# Patient Record
Sex: MALE | Race: BLACK OR AFRICAN AMERICAN | Employment: PART TIME | URBAN - METROPOLITAN AREA
[De-identification: names, ages, dates, MRNs, and addresses within clinical notes are randomized per-mention and may not be internally consistent; named-entity substitution may affect disease eponyms.]

---

## 2017-02-20 RX ORDER — AMLODIPINE BESYLATE 10 MG/1
TABLET ORAL
Qty: 90 TABLET | Refills: 2 | Status: SHIPPED | OUTPATIENT
Start: 2017-02-20 | End: 2017-11-18

## 2017-05-01 ENCOUNTER — OFFICE VISIT (OUTPATIENT)
Dept: INTERNAL MEDICINE CLINIC | Facility: CLINIC | Age: 57
End: 2017-05-01

## 2017-05-01 ENCOUNTER — APPOINTMENT (OUTPATIENT)
Dept: LAB | Facility: HOSPITAL | Age: 57
End: 2017-05-01
Attending: INTERNAL MEDICINE
Payer: COMMERCIAL

## 2017-05-01 VITALS
DIASTOLIC BLOOD PRESSURE: 74 MMHG | HEART RATE: 66 BPM | TEMPERATURE: 98 F | WEIGHT: 175.63 LBS | RESPIRATION RATE: 18 BRPM | SYSTOLIC BLOOD PRESSURE: 138 MMHG | HEIGHT: 69 IN | BODY MASS INDEX: 26.01 KG/M2

## 2017-05-01 DIAGNOSIS — Z11.59 NEED FOR HEPATITIS C SCREENING TEST: ICD-10-CM

## 2017-05-01 DIAGNOSIS — Z85.528 HISTORY OF RENAL CELL CANCER: ICD-10-CM

## 2017-05-01 DIAGNOSIS — Z23 NEED FOR VACCINATION: ICD-10-CM

## 2017-05-01 DIAGNOSIS — K90.41 GLUTEN INTOLERANCE: ICD-10-CM

## 2017-05-01 DIAGNOSIS — I10 ESSENTIAL HYPERTENSION WITH GOAL BLOOD PRESSURE LESS THAN 140/90: Primary | ICD-10-CM

## 2017-05-01 DIAGNOSIS — N40.1 BENIGN PROSTATIC HYPERPLASIA WITH LOWER URINARY TRACT SYMPTOMS, UNSPECIFIED MORPHOLOGY: ICD-10-CM

## 2017-05-01 PROCEDURE — 90471 IMMUNIZATION ADMIN: CPT | Performed by: INTERNAL MEDICINE

## 2017-05-01 PROCEDURE — 99214 OFFICE O/P EST MOD 30 MIN: CPT | Performed by: INTERNAL MEDICINE

## 2017-05-01 PROCEDURE — 99212 OFFICE O/P EST SF 10 MIN: CPT | Performed by: INTERNAL MEDICINE

## 2017-05-01 PROCEDURE — 90736 HZV VACCINE LIVE SUBQ: CPT | Performed by: INTERNAL MEDICINE

## 2017-05-01 PROCEDURE — 86803 HEPATITIS C AB TEST: CPT

## 2017-05-01 PROCEDURE — 36415 COLL VENOUS BLD VENIPUNCTURE: CPT

## 2017-05-01 RX ORDER — DIAZEPAM 5 MG/1
5 TABLET ORAL
COMMUNITY
Start: 2010-06-02 | End: 2017-05-01 | Stop reason: ALTCHOICE

## 2017-05-01 RX ORDER — AMOXICILLIN 250 MG
1 CAPSULE ORAL
COMMUNITY
Start: 2010-06-02 | End: 2017-05-22

## 2017-05-01 RX ORDER — HYDROCODONE BITARTRATE AND ACETAMINOPHEN 5; 325 MG/1; MG/1
1-2 TABLET ORAL
COMMUNITY
Start: 2010-06-02 | End: 2017-05-01 | Stop reason: ALTCHOICE

## 2017-05-01 RX ORDER — TAMSULOSIN HYDROCHLORIDE 0.4 MG/1
0.4 CAPSULE ORAL DAILY
Qty: 30 CAPSULE | Refills: 5 | Status: SHIPPED | OUTPATIENT
Start: 2017-05-01 | End: 2017-06-26

## 2017-05-01 NOTE — PROGRESS NOTES
HPI:    Patient ID: Arleen Harvey is a 62year old male. HPI  Patient history of chronic medical issues as listed below. Last seen here for general physical exam.  He was doing reasonably well at that time. Blood pressure was a little bit elevated.   He Bilateral     Comment knee injuries    LAPAROSCOPY, SURGICAL; NEPHRECTOMY Right April 2015    Comment Robotic surgery at [facility]      Family History   Problem Relation Age of Onset   • Hypertension Mother    • Arthritis Mother      gout   • Cancer Nose: Nose normal.   Mouth/Throat: Oropharynx is clear and moist.   Eyes: Pupils are equal, round, and reactive to light. Cardiovascular: Normal rate, regular rhythm, normal heart sounds and intact distal pulses.   Exam reveals no gallop and no friction test.    (Z23) Need for vaccination  Plan: ZOSTER VACC LIVE SUBQ [address]        We will give shingles vaccination today.       Meds This Visit:  No prescriptions requested or ordered in this encounter       Imaging & Referrals:  None         #[id number]

## 2017-05-20 RX ORDER — METOPROLOL SUCCINATE 100 MG/1
TABLET, EXTENDED RELEASE ORAL
Qty: 90 TABLET | Refills: 3 | Status: SHIPPED | OUTPATIENT
Start: 2017-05-20 | End: 2018-04-23

## 2017-05-22 ENCOUNTER — OFFICE VISIT (OUTPATIENT)
Dept: SURGERY | Facility: CLINIC | Age: 57
End: 2017-05-22

## 2017-05-22 VITALS
SYSTOLIC BLOOD PRESSURE: 161 MMHG | HEART RATE: 64 BPM | HEIGHT: 69 IN | BODY MASS INDEX: 25.92 KG/M2 | DIASTOLIC BLOOD PRESSURE: 80 MMHG | WEIGHT: 175 LBS | TEMPERATURE: 98 F | RESPIRATION RATE: 16 BRPM

## 2017-05-22 DIAGNOSIS — R82.90 URINE FINDING: ICD-10-CM

## 2017-05-22 DIAGNOSIS — C64.1 RENAL CANCER, RIGHT (HCC): Primary | ICD-10-CM

## 2017-05-22 DIAGNOSIS — N13.30 HYDRONEPHROSIS, LEFT: ICD-10-CM

## 2017-05-22 PROCEDURE — 99213 OFFICE O/P EST LOW 20 MIN: CPT | Performed by: UROLOGY

## 2017-05-22 PROCEDURE — 81003 URINALYSIS AUTO W/O SCOPE: CPT | Performed by: UROLOGY

## 2017-05-22 PROCEDURE — 99214 OFFICE O/P EST MOD 30 MIN: CPT | Performed by: UROLOGY

## 2017-05-22 NOTE — PROGRESS NOTES
Willis Ramirez is a 62year old male. HPI:   Patient presents with:  Kidney Problem: Patient on Flomax. Patient denies urinary obstructive issues. Nocturia 2x per night. Patient states night time urination has improved. Denies dysuria and gross hematuria. Comment Robotic surgery at [facility]      Family History   Problem Relation Age of Onset   • Hypertension Mother    • Arthritis Mother      gout   • Cancer Father      salivary and kidney      Social History:   Smoking Status: Former Smoker weeks otherwise. I spent a total of 25 minutes with patient more than half the time in face-to-face discussion.          Orders This Visit:    Orders Placed This Encounter  POC Urinalysis, Automated Dip without microscopy (PCA and EMMG ONLY) [78412]    Med

## 2017-05-30 ENCOUNTER — APPOINTMENT (OUTPATIENT)
Dept: LAB | Facility: HOSPITAL | Age: 57
End: 2017-05-30
Attending: UROLOGY
Payer: COMMERCIAL

## 2017-05-30 DIAGNOSIS — C64.1 RENAL CANCER, RIGHT (HCC): ICD-10-CM

## 2017-05-30 PROCEDURE — 80053 COMPREHEN METABOLIC PANEL: CPT

## 2017-05-30 PROCEDURE — 36415 COLL VENOUS BLD VENIPUNCTURE: CPT

## 2017-05-30 PROCEDURE — 85027 COMPLETE CBC AUTOMATED: CPT

## 2017-06-01 ENCOUNTER — HOSPITAL ENCOUNTER (OUTPATIENT)
Dept: ULTRASOUND IMAGING | Facility: HOSPITAL | Age: 57
Discharge: HOME | End: 2017-06-01
Attending: UROLOGY
Payer: COMMERCIAL

## 2017-06-01 DIAGNOSIS — N13.30 HYDRONEPHROSIS, LEFT: ICD-10-CM

## 2017-06-01 PROCEDURE — 76770 US EXAM ABDO BACK WALL COMP: CPT | Performed by: UROLOGY

## 2017-06-12 ENCOUNTER — OFFICE VISIT (OUTPATIENT)
Dept: SURGERY | Facility: CLINIC | Age: 57
End: 2017-06-12

## 2017-06-12 VITALS
HEART RATE: 67 BPM | TEMPERATURE: 99 F | SYSTOLIC BLOOD PRESSURE: 122 MMHG | DIASTOLIC BLOOD PRESSURE: 76 MMHG | RESPIRATION RATE: 16 BRPM

## 2017-06-12 DIAGNOSIS — C64.1 RENAL CANCER, RIGHT (HCC): Primary | ICD-10-CM

## 2017-06-12 PROCEDURE — 99213 OFFICE O/P EST LOW 20 MIN: CPT | Performed by: UROLOGY

## 2017-06-12 PROCEDURE — 99212 OFFICE O/P EST SF 10 MIN: CPT | Performed by: UROLOGY

## 2017-06-12 NOTE — PROGRESS NOTES
Cathleen Munoz is a 62year old male. 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

## 2017-06-26 RX ORDER — TAMSULOSIN HYDROCHLORIDE 0.4 MG/1
0.4 CAPSULE ORAL DAILY
Qty: 30 CAPSULE | Refills: 2 | Status: SHIPPED | OUTPATIENT
Start: 2017-06-26 | End: 2017-09-17

## 2017-06-26 NOTE — TELEPHONE ENCOUNTER
Hypertensive Medications  Protocol Criteria:  · Appointment scheduled in the past 6 months or in the next 3 months  · BMP or CMP in the past 12 months  · Creatinine result < 2  Recent Outpatient Visits            2 weeks ago Renal cancer, right ([facility] [address]    El

## 2017-06-26 NOTE — TELEPHONE ENCOUNTER
From: Hector Burns  To: Sherrie Vincent MD  Sent: 6/23/2017 12:10 PM CDT  Subject: Medication Renewal Request    Original authorizing provider: MD Hector Blair would like a refill of the following medications:  tamsulosin HCl (FLOMAX) 0.4

## 2017-08-28 RX ORDER — LOSARTAN POTASSIUM 100 MG/1
100 TABLET ORAL
Qty: 90 TABLET | Refills: 1 | Status: SHIPPED | OUTPATIENT
Start: 2017-08-28 | End: 2017-11-18

## 2017-08-28 NOTE — TELEPHONE ENCOUNTER
From: Cat Eldridge  Sent: 8/26/2017 12:52 PM CDT  Subject: Medication Renewal Request    Cat Eldridge would like a refill of the following medications:  aspirin 81 MG Oral Tab Sera Vasquez MD]  Losartan Potassium 100 MG Oral Tab Sera Vasquez MD]    P

## 2017-08-29 RX ORDER — LOSARTAN POTASSIUM 100 MG/1
100 TABLET ORAL
Qty: 90 TABLET | Refills: 1 | OUTPATIENT
Start: 2017-08-29

## 2017-09-18 RX ORDER — TAMSULOSIN HYDROCHLORIDE 0.4 MG/1
0.4 CAPSULE ORAL DAILY
Qty: 30 CAPSULE | Refills: 2 | Status: CANCELLED
Start: 2017-09-18

## 2017-09-20 RX ORDER — TAMSULOSIN HYDROCHLORIDE 0.4 MG/1
0.4 CAPSULE ORAL DAILY
Qty: 30 CAPSULE | Refills: 5 | Status: SHIPPED | OUTPATIENT
Start: 2017-09-20 | End: 2018-03-15

## 2017-09-21 NOTE — TELEPHONE ENCOUNTER
From: Anh Fuller  Sent: 9/18/2017 9:19 AM CDT  Subject: Medication Renewal Request    Anh Fuller would like a refill of the following medications:     tamsulosin HCl (FLOMAX) 0.4 MG Oral Cap Patric Melton MD]   Patient Comment: 90 day refill    Prefer

## 2017-10-09 ENCOUNTER — OFFICE VISIT (OUTPATIENT)
Dept: INTERNAL MEDICINE CLINIC | Facility: CLINIC | Age: 57
End: 2017-10-09

## 2017-10-09 ENCOUNTER — LAB ENCOUNTER (OUTPATIENT)
Dept: LAB | Facility: HOSPITAL | Age: 57
End: 2017-10-09
Attending: INTERNAL MEDICINE
Payer: COMMERCIAL

## 2017-10-09 VITALS
HEIGHT: 69 IN | TEMPERATURE: 98 F | DIASTOLIC BLOOD PRESSURE: 78 MMHG | BODY MASS INDEX: 26.79 KG/M2 | WEIGHT: 180.88 LBS | SYSTOLIC BLOOD PRESSURE: 138 MMHG | RESPIRATION RATE: 20 BRPM | HEART RATE: 68 BPM

## 2017-10-09 DIAGNOSIS — K90.41 GLUTEN INTOLERANCE: ICD-10-CM

## 2017-10-09 DIAGNOSIS — Z23 NEED FOR VACCINATION: ICD-10-CM

## 2017-10-09 DIAGNOSIS — Z00.00 ROUTINE PHYSICAL EXAMINATION: ICD-10-CM

## 2017-10-09 DIAGNOSIS — Z00.00 ROUTINE PHYSICAL EXAMINATION: Primary | ICD-10-CM

## 2017-10-09 DIAGNOSIS — I10 ESSENTIAL HYPERTENSION WITH GOAL BLOOD PRESSURE LESS THAN 140/90: ICD-10-CM

## 2017-10-09 DIAGNOSIS — Z85.528 HISTORY OF RENAL CELL CANCER: ICD-10-CM

## 2017-10-09 PROCEDURE — 90471 IMMUNIZATION ADMIN: CPT | Performed by: INTERNAL MEDICINE

## 2017-10-09 PROCEDURE — 80061 LIPID PANEL: CPT

## 2017-10-09 PROCEDURE — 80053 COMPREHEN METABOLIC PANEL: CPT

## 2017-10-09 PROCEDURE — 90686 IIV4 VACC NO PRSV 0.5 ML IM: CPT | Performed by: INTERNAL MEDICINE

## 2017-10-09 PROCEDURE — 36415 COLL VENOUS BLD VENIPUNCTURE: CPT

## 2017-10-09 PROCEDURE — 99396 PREV VISIT EST AGE 40-64: CPT | Performed by: INTERNAL MEDICINE

## 2017-10-09 PROCEDURE — 85025 COMPLETE CBC W/AUTO DIFF WBC: CPT

## 2017-10-09 PROCEDURE — 84443 ASSAY THYROID STIM HORMONE: CPT

## 2017-10-09 NOTE — PROGRESS NOTES
HPI:    Patient ID: Octavia Price is a 62year old male. HPI  Patient is here for physical exam and follow-up on chronic medical issues as listed below. Last seen here in May. No changes made at that time. He had a CMP and CBC done at that time.   Chron 1993   Family History   Problem Relation Age of Onset   • Hypertension Mother    • Arthritis Mother      gout   • Cancer Father      salivary and kidney      Smoking status: Former Smoker                                                              Packs/d Physical Exam    Constitutional: He appears well-developed and well-nourished.    HENT:   Right Ear: Tympanic membrane and ear canal normal.   Left Ear: Tympanic membrane and ear canal normal.   Nose: Nose normal.   Mouth/Throat: Oropharynx is clear and m hypertension with goal blood pressure less than 140/90  Plan: Blood pressure little bit elevated initially but better when checked second time.   Continue same medications.    (Z85.528) History of renal cell cancer  Plan: No evidence of recurrence since vitor

## 2017-11-06 ENCOUNTER — OFFICE VISIT (OUTPATIENT)
Dept: SURGERY | Facility: CLINIC | Age: 57
End: 2017-11-06

## 2017-11-06 VITALS
HEIGHT: 69 IN | HEART RATE: 69 BPM | DIASTOLIC BLOOD PRESSURE: 95 MMHG | BODY MASS INDEX: 26.66 KG/M2 | WEIGHT: 180 LBS | SYSTOLIC BLOOD PRESSURE: 148 MMHG | TEMPERATURE: 99 F

## 2017-11-06 DIAGNOSIS — C64.9 MALIGNANT NEOPLASM OF KIDNEY, UNSPECIFIED LATERALITY (HCC): Primary | ICD-10-CM

## 2017-11-06 PROCEDURE — 99212 OFFICE O/P EST SF 10 MIN: CPT | Performed by: UROLOGY

## 2017-11-06 PROCEDURE — 99213 OFFICE O/P EST LOW 20 MIN: CPT | Performed by: UROLOGY

## 2017-11-06 NOTE — PROGRESS NOTES
Marcy Bassett is a 62year old male. HPI:   Patient presents with:  Kidney Problem: Denies any irritative or obstructive voiding issues.        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  Disp: 30 capsule Rfl: 5   aspirin 81 MG Oral

## 2017-11-18 RX ORDER — AMLODIPINE BESYLATE 10 MG/1
10 TABLET ORAL
Qty: 90 TABLET | Refills: 2 | Status: SHIPPED
Start: 2017-11-18 | End: 2018-08-10

## 2017-11-18 RX ORDER — LOSARTAN POTASSIUM 100 MG/1
100 TABLET ORAL
Qty: 90 TABLET | Refills: 2 | Status: SHIPPED
Start: 2017-11-18 | End: 2018-08-10

## 2017-11-18 NOTE — TELEPHONE ENCOUNTER
From: Bianka Brizuela  Sent: 11/18/2017 10:17 AM CST  Subject: Medication Renewal Request    Bianka Brizuela would like a refill of the following medications:     AMLODIPINE BESYLATE 10 MG Oral Tab Mason Vasquez MD]   Patient Comment: only 2 tabs left     losar

## 2017-11-18 NOTE — TELEPHONE ENCOUNTER
Signed Prescriptions Disp Refills    AmLODIPine Besylate 10 MG Oral Tab 90 tablet 2      Sig: Take 1 tablet (10 mg total) by mouth once daily.         Authorizing Provider: Je Archuleta        Ordering User: Marita Castro      losartan 100 MG Oral Ta

## 2017-11-20 ENCOUNTER — HOSPITAL ENCOUNTER (OUTPATIENT)
Dept: GENERAL RADIOLOGY | Facility: HOSPITAL | Age: 57
Discharge: HOME | End: 2017-11-20
Attending: UROLOGY
Payer: COMMERCIAL

## 2017-11-20 DIAGNOSIS — C64.9 MALIGNANT NEOPLASM OF KIDNEY, UNSPECIFIED LATERALITY (HCC): ICD-10-CM

## 2017-11-20 PROCEDURE — 71020 XR CHEST PA + LAT CHEST (CPT=71020): CPT | Performed by: UROLOGY

## 2017-12-19 ENCOUNTER — HOSPITAL ENCOUNTER (OUTPATIENT)
Dept: GENERAL RADIOLOGY | Facility: HOSPITAL | Age: 57
Discharge: HOME | End: 2017-12-19
Attending: ORTHOPAEDIC SURGERY | Admitting: ORTHOPAEDIC SURGERY
Payer: COMMERCIAL

## 2017-12-19 ENCOUNTER — OFFICE VISIT (OUTPATIENT)
Dept: ORTHOPEDICS CLINIC | Facility: CLINIC | Age: 57
End: 2017-12-19

## 2017-12-19 DIAGNOSIS — M50.30 DDD (DEGENERATIVE DISC DISEASE), CERVICAL: ICD-10-CM

## 2017-12-19 DIAGNOSIS — M25.561 RIGHT KNEE PAIN, UNSPECIFIED CHRONICITY: ICD-10-CM

## 2017-12-19 DIAGNOSIS — M54.2 NECK PAIN: ICD-10-CM

## 2017-12-19 DIAGNOSIS — M17.11 PRIMARY OSTEOARTHRITIS OF RIGHT KNEE: Primary | ICD-10-CM

## 2017-12-19 PROCEDURE — 72040 X-RAY EXAM NECK SPINE 2-3 VW: CPT | Performed by: ORTHOPAEDIC SURGERY

## 2017-12-19 PROCEDURE — 99212 OFFICE O/P EST SF 10 MIN: CPT | Performed by: ORTHOPAEDIC SURGERY

## 2017-12-19 PROCEDURE — 73565 X-RAY EXAM OF KNEES: CPT | Performed by: ORTHOPAEDIC SURGERY

## 2017-12-19 PROCEDURE — 73560 X-RAY EXAM OF KNEE 1 OR 2: CPT | Performed by: ORTHOPAEDIC SURGERY

## 2017-12-19 PROCEDURE — 99204 OFFICE O/P NEW MOD 45 MIN: CPT | Performed by: ORTHOPAEDIC SURGERY

## 2017-12-19 NOTE — PROGRESS NOTES
12/19/2017  Olga Lidiasangeetha Angelos  15/1960  62year old   male  Christine Lugo MD    HPI:   Patient presents with:  Consult: Right knee pain- pt states pain started yrs ago. pt states he has back sx a couple yrs ago.    Neck Pain: pt states pain started yrs ago TOTAL) BY MOUTH ONCE DAILY.  Disp: 90 tablet Rfl: 3      HISTORY:  Past Medical History:   Diagnosis Date   • Cardiovascular disorder 2010    Cardiac calcium scan- normal   • Disc displacement 2010    Microdiscectomy L4-5; good outcome, Completed at [facility] and pink with brisk capillary refill and 2+ distal pulses. Sensation is intact to light touch in superficial peroneal, deep peroneal, sural, saphenous, and tibial nerve distributions.   The patient has 5/5 strength in tibialis anterior, gastrocsoleus compl

## 2017-12-27 ENCOUNTER — TELEPHONE (OUTPATIENT)
Dept: SURGERY | Facility: CLINIC | Age: 57
End: 2017-12-27

## 2017-12-27 NOTE — TELEPHONE ENCOUNTER
----- Message from Kylie Nicole MD sent at 12/25/2017  1:53 PM CST -----  Staff please call this patient. Let them know that I reviewed his recent chest x-ray.   Most likely shows nothing of suspicion but there are some shadows that the radiologist Maile Cee

## 2017-12-27 NOTE — TELEPHONE ENCOUNTER
Phoned pt and received voice mail. Lmtcb, stating Lavonmarie Mccoy has some information and instructions for him. Clinic call back number provided. BAKARI, if pt returns call, please transfer to nurse. Thank you.

## 2017-12-29 ENCOUNTER — HOSPITAL ENCOUNTER (OUTPATIENT)
Dept: GENERAL RADIOLOGY | Facility: HOSPITAL | Age: 57
Discharge: HOME | End: 2017-12-29
Attending: UROLOGY
Payer: COMMERCIAL

## 2017-12-29 DIAGNOSIS — C64.9 MALIGNANT NEOPLASM OF KIDNEY, UNSPECIFIED LATERALITY (HCC): ICD-10-CM

## 2017-12-29 PROCEDURE — 71022: CPT | Performed by: UROLOGY

## 2017-12-29 NOTE — TELEPHONE ENCOUNTER
Patient presented to . Patient is aware of his test results below and verbalized understanding. Gave patient order for repeat chest x-ray. Patient states he will complete today.  Informed patient that test results will be reviewed when Dr. Gilda Charlton

## 2018-01-17 ENCOUNTER — OFFICE VISIT (OUTPATIENT)
Dept: ORTHOPEDICS CLINIC | Facility: CLINIC | Age: 58
End: 2018-01-17

## 2018-01-17 DIAGNOSIS — M54.2 NECK PAIN: Primary | ICD-10-CM

## 2018-01-17 PROCEDURE — 99244 OFF/OP CNSLTJ NEW/EST MOD 40: CPT | Performed by: ORTHOPAEDIC SURGERY

## 2018-01-17 PROCEDURE — 99212 OFFICE O/P EST SF 10 MIN: CPT | Performed by: ORTHOPAEDIC SURGERY

## 2018-01-17 NOTE — PROGRESS NOTES
[address], [address] [facility]    Patient: [facility] Record Number: [id number]  Site: [address] [facility], [address] [place] [address],[address], [address]  Referring Physician:  No ref.  provider found He had his lower back surgery 10 years ago and hads some residual R LE numbness. HE works as a  for [employer]. He was seen by Dr LOZADA for his shoulders.       VAS Pain Score: 4 /10    Aggravating Factors: Relieving Factors:   · Sitting  · Standing  · walking Smoking status: Former Smoker                                                                Packs/day: 0.00      Years: 20.00          Types: Cigars    Smokeless tobacco: Never Used                        Comment: tobacco on and off for 20 years    [place] NEURO: n balance problems, no seizure problems, no increased clumsiness, dropping things, no tremor,  n headaches/ migraines  PSYCHE: n depression, n anxiety. HEMATOLOGY: denies hx anemia; denies bruising or excessive bleeding.   ENDOCRINE: denies excessiv DTR grossly intact throughout bilateral upper extremities, 2/4 throughout, DTR grossly intact throughout bilateral upper and lower extremities, 2/4 throughout    Strength:  Strength of bilateral upper extremities grossly intact; 5/5 throughout  Tone: fernando

## 2018-01-17 NOTE — PATIENT INSTRUCTIONS
[address] [facility]    Treatment Plan    Diagnosis :    Cervical spinal stenosis    We will start out with   (1) physical Therapy,  For 4 weeks     I also recommend to schedule an

## 2018-03-15 NOTE — TELEPHONE ENCOUNTER
Med not part of protocol.   ·   Recent Outpatient Visits            1 month ago Neck pain    [facility] for Jenna Polo MD    Office Visit    2 months ago Primary osteoarthritis of right knee    [address] [facility]

## 2018-03-16 RX ORDER — TAMSULOSIN HYDROCHLORIDE 0.4 MG/1
0.4 CAPSULE ORAL DAILY
Qty: 90 CAPSULE | Refills: 0 | Status: SHIPPED | OUTPATIENT
Start: 2018-03-16 | End: 2018-06-13

## 2018-04-23 ENCOUNTER — OFFICE VISIT (OUTPATIENT)
Dept: INTERNAL MEDICINE CLINIC | Facility: CLINIC | Age: 58
End: 2018-04-23

## 2018-04-23 VITALS
SYSTOLIC BLOOD PRESSURE: 150 MMHG | WEIGHT: 183.13 LBS | TEMPERATURE: 98 F | HEIGHT: 69 IN | RESPIRATION RATE: 20 BRPM | HEART RATE: 72 BPM | BODY MASS INDEX: 27.12 KG/M2 | DIASTOLIC BLOOD PRESSURE: 76 MMHG

## 2018-04-23 DIAGNOSIS — D17.1 LIPOMA OF TORSO: ICD-10-CM

## 2018-04-23 DIAGNOSIS — N40.0 BENIGN PROSTATIC HYPERPLASIA, UNSPECIFIED WHETHER LOWER URINARY TRACT SYMPTOMS PRESENT: ICD-10-CM

## 2018-04-23 DIAGNOSIS — I10 ESSENTIAL HYPERTENSION WITH GOAL BLOOD PRESSURE LESS THAN 140/90: Primary | ICD-10-CM

## 2018-04-23 DIAGNOSIS — K90.41 GLUTEN INTOLERANCE: ICD-10-CM

## 2018-04-23 DIAGNOSIS — M19.90 OSTEOARTHRITIS, UNSPECIFIED OSTEOARTHRITIS TYPE, UNSPECIFIED SITE: ICD-10-CM

## 2018-04-23 PROCEDURE — 99214 OFFICE O/P EST MOD 30 MIN: CPT | Performed by: INTERNAL MEDICINE

## 2018-04-23 PROCEDURE — 99212 OFFICE O/P EST SF 10 MIN: CPT | Performed by: INTERNAL MEDICINE

## 2018-04-23 RX ORDER — METOPROLOL SUCCINATE 100 MG/1
150 TABLET, EXTENDED RELEASE ORAL DAILY
Qty: 135 TABLET | Refills: 3 | Status: SHIPPED | OUTPATIENT
Start: 2018-04-23 | End: 2018-08-09

## 2018-04-23 NOTE — PROGRESS NOTES
HPI:    Patient ID: Bianka Brizuela is a 62year old male. HPI  Patient is here for follow-up on chronic medical issues. Last seen here in October 9 for general physical exam.  No changes made at that time. Full blood work done.   Since that time he seen t Comment: repair of rectal tear  No date: TONSILLECTOMY      Comment: tonstillitis 1993   Family History   Problem Relation Age of Onset   • Cancer Father      salivary and kidney   • Hypertension Mother    • Arthritis Mother      gout      Smoking status: excessive urination             Severe leg cramps  Wheat Extract           Hives   PHYSICAL EXAM:   Physical Exam    Constitutional: He appears well-developed and well-nourished.    HENT:   Nose: Nose normal.   Mouth/Throat: Oropharynx is clear and moist. glucosamin.  CPM      Meds This Visit:  No prescriptions requested or ordered in this encounter    Imaging & Referrals:  None         #[id number]

## 2018-06-13 RX ORDER — TAMSULOSIN HYDROCHLORIDE 0.4 MG/1
0.4 CAPSULE ORAL DAILY
Qty: 90 CAPSULE | Refills: 1 | Status: SHIPPED | OUTPATIENT
Start: 2018-06-13 | End: 2018-12-11

## 2018-06-13 NOTE — TELEPHONE ENCOUNTER
LOV: 4-23-18 Last Rx: 3-16-18     No protocol     Please advise in regards to refill request. Thank You

## 2018-06-18 ENCOUNTER — LAB ENCOUNTER (OUTPATIENT)
Dept: LAB | Facility: HOSPITAL | Age: 58
End: 2018-06-18
Attending: UROLOGY
Payer: COMMERCIAL

## 2018-06-18 ENCOUNTER — HOSPITAL ENCOUNTER (OUTPATIENT)
Dept: GENERAL RADIOLOGY | Facility: HOSPITAL | Age: 58
Discharge: HOME | End: 2018-06-18
Attending: UROLOGY
Payer: COMMERCIAL

## 2018-06-18 ENCOUNTER — OFFICE VISIT (OUTPATIENT)
Dept: SURGERY | Facility: CLINIC | Age: 58
End: 2018-06-18

## 2018-06-18 VITALS
HEART RATE: 62 BPM | DIASTOLIC BLOOD PRESSURE: 73 MMHG | HEIGHT: 69 IN | SYSTOLIC BLOOD PRESSURE: 128 MMHG | BODY MASS INDEX: 26.66 KG/M2 | WEIGHT: 180 LBS | TEMPERATURE: 98 F

## 2018-06-18 DIAGNOSIS — C64.1 MALIGNANT NEOPLASM OF RIGHT KIDNEY (HCC): ICD-10-CM

## 2018-06-18 DIAGNOSIS — C64.1 MALIGNANT NEOPLASM OF RIGHT KIDNEY (HCC): Primary | ICD-10-CM

## 2018-06-18 PROCEDURE — 85025 COMPLETE CBC W/AUTO DIFF WBC: CPT

## 2018-06-18 PROCEDURE — 99214 OFFICE O/P EST MOD 30 MIN: CPT | Performed by: UROLOGY

## 2018-06-18 PROCEDURE — 80053 COMPREHEN METABOLIC PANEL: CPT

## 2018-06-18 PROCEDURE — 36415 COLL VENOUS BLD VENIPUNCTURE: CPT

## 2018-06-18 PROCEDURE — 99212 OFFICE O/P EST SF 10 MIN: CPT | Performed by: UROLOGY

## 2018-06-18 PROCEDURE — 71046 X-RAY EXAM CHEST 2 VIEWS: CPT | Performed by: UROLOGY

## 2018-06-18 NOTE — PROGRESS NOTES
Bianka Brizuela is a 62year old male.     HPI:   Patient presents with:  Kidney Problem: patient presents fore 6 mo f/u on right renal cell ca      60-year-old with a history of right renal cell cancer pathologic papillary renal cell type I stage T1 a NX statu tobacco on and off for 20 years  Alcohol use:  Yes              Comment: 1-3 beer 3 times a week       Medications (Active prior to today's visit):    Current Outpatient Prescriptions:  TAMSULOSIN HCL 0.4 MG Oral Cap TAKE 1 CAPSULE (0.4 MG TOTAL) BY MOUTH D

## 2018-08-09 RX ORDER — METOPROLOL SUCCINATE 100 MG/1
150 TABLET, EXTENDED RELEASE ORAL DAILY
Qty: 135 TABLET | Refills: 0 | Status: SHIPPED | OUTPATIENT
Start: 2018-08-09 | End: 2018-11-07

## 2018-08-09 NOTE — TELEPHONE ENCOUNTER
From: Octavia Price  Sent: 8/9/2018 7:53 AM CDT  Subject: Medication Renewal Request    Octavia Price would like a refill of the following medications:     Metoprolol Succinate  MG Oral Tablet 24 Hr Kacey Ayala MD]   Patient Comment: Kesha Abrams

## 2018-08-10 RX ORDER — LOSARTAN POTASSIUM 100 MG/1
100 TABLET ORAL
Qty: 90 TABLET | Refills: 2 | Status: SHIPPED | OUTPATIENT
Start: 2018-08-10 | End: 2019-03-01

## 2018-08-10 RX ORDER — AMLODIPINE BESYLATE 10 MG/1
10 TABLET ORAL
Qty: 90 TABLET | Refills: 2 | Status: SHIPPED | OUTPATIENT
Start: 2018-08-10 | End: 2019-05-01

## 2018-08-10 NOTE — TELEPHONE ENCOUNTER
Hypertensive Medications  Protocol Criteria:  · Appointment scheduled in the past 6 months or in the next 3 months  · BMP or CMP in the past 12 months  · Creatinine result < 2  Recent Outpatient Visits            1 month ago Malignant neoplasm of right kid

## 2018-10-16 ENCOUNTER — TELEPHONE (OUTPATIENT)
Dept: INTERNAL MEDICINE CLINIC | Facility: CLINIC | Age: 58
End: 2018-10-16

## 2018-10-16 DIAGNOSIS — Z00.00 ROUTINE PHYSICAL EXAMINATION: Primary | ICD-10-CM

## 2018-10-16 NOTE — TELEPHONE ENCOUNTER
Pt sent a request via [employer] to have his px lab orders placed on to his chart. Pt has an appt scheduled for 10/22.   Please advise

## 2018-10-22 ENCOUNTER — OFFICE VISIT (OUTPATIENT)
Dept: INTERNAL MEDICINE CLINIC | Facility: CLINIC | Age: 58
End: 2018-10-22
Payer: COMMERCIAL

## 2018-10-22 VITALS
HEIGHT: 69 IN | DIASTOLIC BLOOD PRESSURE: 72 MMHG | HEART RATE: 76 BPM | SYSTOLIC BLOOD PRESSURE: 126 MMHG | RESPIRATION RATE: 20 BRPM | BODY MASS INDEX: 26.56 KG/M2 | TEMPERATURE: 98 F | WEIGHT: 179.31 LBS

## 2018-10-22 DIAGNOSIS — N40.0 BENIGN PROSTATIC HYPERPLASIA, UNSPECIFIED WHETHER LOWER URINARY TRACT SYMPTOMS PRESENT: ICD-10-CM

## 2018-10-22 DIAGNOSIS — I10 ESSENTIAL HYPERTENSION WITH GOAL BLOOD PRESSURE LESS THAN 140/90: ICD-10-CM

## 2018-10-22 DIAGNOSIS — K90.41 GLUTEN INTOLERANCE: ICD-10-CM

## 2018-10-22 DIAGNOSIS — Z00.00 ROUTINE PHYSICAL EXAMINATION: Primary | ICD-10-CM

## 2018-10-22 DIAGNOSIS — M19.90 OSTEOARTHRITIS, UNSPECIFIED OSTEOARTHRITIS TYPE, UNSPECIFIED SITE: ICD-10-CM

## 2018-10-22 DIAGNOSIS — Z85.528 HISTORY OF RENAL CELL CANCER: ICD-10-CM

## 2018-10-22 PROCEDURE — 99396 PREV VISIT EST AGE 40-64: CPT | Performed by: INTERNAL MEDICINE

## 2018-10-22 RX ORDER — DIAZEPAM 5 MG/1
5 TABLET ORAL
COMMUNITY
Start: 2010-06-02 | End: 2018-10-22 | Stop reason: ALTCHOICE

## 2018-10-22 RX ORDER — AMOXICILLIN 250 MG
1 CAPSULE ORAL
COMMUNITY
Start: 2010-06-02 | End: 2018-10-22 | Stop reason: ALTCHOICE

## 2018-10-22 RX ORDER — HYDROCODONE BITARTRATE AND ACETAMINOPHEN 5; 325 MG/1; MG/1
1-2 TABLET ORAL
COMMUNITY
Start: 2010-06-02 | End: 2018-10-22 | Stop reason: ALTCHOICE

## 2018-10-22 NOTE — PROGRESS NOTES
HPI:    Patient ID: Tiki Crowley is a 62year old male. HPI  Patient is here for physical exam and follow-up on chronic medical issues as listed below. Last seen here in April. At that time increased metoprolol from 100 up to 150 mg a day.   Since that RECTUM SURGERY PROCEDURE UNLISTED  1994    repair of rectal tear   • TONSILLECTOMY      tonstillitis 1993      Family History   Problem Relation Age of Onset   • Cancer Father         salivary and kidney   • Hypertension Mother    • Arthritis Mother Comment:Other reaction(s): Other             Severe leg cramps and excessive urination             Severe leg cramps  Wheat Extract           HIVES   PHYSICAL EXAM:   Physical Exam    Constitutional: He appears well-developed and well-nourished.    HENT: laboratories. 2. Essential hypertension with goal blood pressure less than 140/90  Blood pressure is well controlled. Continue current medication.     3. Osteoarthritis, unspecified osteoarthritis type, unspecified site  Not taking any medications right

## 2018-11-08 RX ORDER — METOPROLOL SUCCINATE 100 MG/1
150 TABLET, EXTENDED RELEASE ORAL DAILY
Qty: 135 TABLET | Refills: 0 | Status: SHIPPED | OUTPATIENT
Start: 2018-11-08 | End: 2018-11-12

## 2018-11-13 RX ORDER — METOPROLOL SUCCINATE 100 MG/1
150 TABLET, EXTENDED RELEASE ORAL DAILY
Qty: 135 TABLET | Refills: 0 | Status: SHIPPED | OUTPATIENT
Start: 2018-11-13 | End: 2019-02-13

## 2018-12-13 RX ORDER — TAMSULOSIN HYDROCHLORIDE 0.4 MG/1
0.4 CAPSULE ORAL DAILY
Qty: 90 CAPSULE | Refills: 1 | Status: SHIPPED | OUTPATIENT
Start: 2018-12-13 | End: 2019-07-04

## 2018-12-14 ENCOUNTER — OFFICE VISIT (OUTPATIENT)
Dept: ORTHOPEDICS CLINIC | Facility: CLINIC | Age: 58
End: 2018-12-14
Payer: COMMERCIAL

## 2018-12-14 VITALS — RESPIRATION RATE: 18 BRPM | HEART RATE: 71 BPM | DIASTOLIC BLOOD PRESSURE: 69 MMHG | SYSTOLIC BLOOD PRESSURE: 124 MMHG

## 2018-12-14 DIAGNOSIS — M17.11 PRIMARY OSTEOARTHRITIS OF RIGHT KNEE: Primary | ICD-10-CM

## 2018-12-14 PROCEDURE — 99212 OFFICE O/P EST SF 10 MIN: CPT | Performed by: ORTHOPAEDIC SURGERY

## 2018-12-14 PROCEDURE — 99213 OFFICE O/P EST LOW 20 MIN: CPT | Performed by: ORTHOPAEDIC SURGERY

## 2018-12-14 PROCEDURE — 20610 DRAIN/INJ JOINT/BURSA W/O US: CPT | Performed by: ORTHOPAEDIC SURGERY

## 2018-12-14 NOTE — PROGRESS NOTES
This is a pleasant 41-year-old male with right knee osteoarthritis. The patient reports that he has had intermittent swelling of the right knee over the past few months.   The patient reports he has difficulty flexing the knee secondary to pain and swellin

## 2018-12-14 NOTE — PROGRESS NOTES
Per verbal order from [facility], draw up 4ml of 1% lidocaine and 2ml of Kenalog 10 for cortisone injection to right knee.  Sara Oliveira

## 2018-12-17 ENCOUNTER — OFFICE VISIT (OUTPATIENT)
Dept: SURGERY | Facility: CLINIC | Age: 58
End: 2018-12-17
Payer: COMMERCIAL

## 2018-12-17 VITALS
DIASTOLIC BLOOD PRESSURE: 76 MMHG | WEIGHT: 180 LBS | SYSTOLIC BLOOD PRESSURE: 159 MMHG | BODY MASS INDEX: 26.66 KG/M2 | HEIGHT: 69 IN | HEART RATE: 59 BPM | TEMPERATURE: 99 F

## 2018-12-17 DIAGNOSIS — C64.1 MALIGNANT NEOPLASM OF RIGHT KIDNEY (HCC): Primary | ICD-10-CM

## 2018-12-17 PROCEDURE — 99212 OFFICE O/P EST SF 10 MIN: CPT | Performed by: UROLOGY

## 2018-12-17 PROCEDURE — 99213 OFFICE O/P EST LOW 20 MIN: CPT | Performed by: UROLOGY

## 2018-12-17 NOTE — PROGRESS NOTES
Bianka Brizuela is a 62year old male. HPI:   Patient presents with:  Kidney Problem: PT presents for 6 month f/u, PT states he has no complaints today.        19-year-old male with a history of right kidney cancer pathologic papillary renal cell type I stag Use      Smoking status: Former Smoker        Years: 20.00        Types: Cigars      Smokeless tobacco: Never Used      Tobacco comment: tobacco on and off for 20 years    Alcohol use: Yes      Comment: 1-3 beer 3 times a week    Drug use: Yes      Blanca Garcia

## 2019-01-11 ENCOUNTER — OFFICE VISIT (OUTPATIENT)
Dept: ORTHOPEDICS CLINIC | Facility: CLINIC | Age: 59
End: 2019-01-11
Payer: COMMERCIAL

## 2019-01-11 DIAGNOSIS — M25.561 ACUTE PAIN OF RIGHT KNEE: Primary | ICD-10-CM

## 2019-01-11 PROCEDURE — 99213 OFFICE O/P EST LOW 20 MIN: CPT | Performed by: ORTHOPAEDIC SURGERY

## 2019-01-11 PROCEDURE — 99212 OFFICE O/P EST SF 10 MIN: CPT | Performed by: ORTHOPAEDIC SURGERY

## 2019-01-11 NOTE — PROGRESS NOTES
This is a pleasant 54-year-old male with previous diagnosis of right knee osteoarthritis. Patient received a cortisone injection 1 month ago. He reports that his pain recently returned. He has pain along the medial and lateral aspect of the knee.   Was i

## 2019-01-16 ENCOUNTER — TELEPHONE (OUTPATIENT)
Dept: ORTHOPEDICS CLINIC | Facility: CLINIC | Age: 59
End: 2019-01-16

## 2019-01-16 NOTE — TELEPHONE ENCOUNTER
Called [employer] [place] to see if PA is required for MRI knee, right. Spoke with Representative, Saeid. She states that No PA is required by [address] however may require RQI through [employer].  She states if [employer] cannot find patient in their system or states RQI no

## 2019-01-16 NOTE — TELEPHONE ENCOUNTER
Fax received from [facility] stating that they were unable to use any of the clinical information faxed over today. Called [facility] to clarify at [phone]. Spoke with representative Franco Mcconnell. He states that Case has been authorized.  Auth # [id number] valid from dates 1

## 2019-01-17 NOTE — TELEPHONE ENCOUNTER
Pt procedure/Testing: MRI Knee, Right  Procedure scheduled where: [facility]  Pt authorization number: [id number]   Case Number/Pending Ref#: [employer] Ref# [id number]  Pt.  Contacted/verification dept contacted if needed: pt contacted informed that PA approved, central

## 2019-01-23 ENCOUNTER — HOSPITAL ENCOUNTER (OUTPATIENT)
Dept: MRI IMAGING | Facility: HOSPITAL | Age: 59
Discharge: HOME | End: 2019-01-23
Attending: ORTHOPAEDIC SURGERY
Payer: COMMERCIAL

## 2019-01-23 DIAGNOSIS — M25.561 ACUTE PAIN OF RIGHT KNEE: ICD-10-CM

## 2019-01-23 PROCEDURE — 73721 MRI JNT OF LWR EXTRE W/O DYE: CPT | Performed by: ORTHOPAEDIC SURGERY

## 2019-02-01 ENCOUNTER — OFFICE VISIT (OUTPATIENT)
Dept: ORTHOPEDICS CLINIC | Facility: CLINIC | Age: 59
End: 2019-02-01
Payer: COMMERCIAL

## 2019-02-01 DIAGNOSIS — M25.561 ACUTE PAIN OF RIGHT KNEE: ICD-10-CM

## 2019-02-01 DIAGNOSIS — M17.11 PRIMARY OSTEOARTHRITIS OF RIGHT KNEE: Primary | ICD-10-CM

## 2019-02-01 PROCEDURE — 99213 OFFICE O/P EST LOW 20 MIN: CPT | Performed by: ORTHOPAEDIC SURGERY

## 2019-02-01 PROCEDURE — 99212 OFFICE O/P EST SF 10 MIN: CPT | Performed by: ORTHOPAEDIC SURGERY

## 2019-02-01 NOTE — PROGRESS NOTES
This is a pleasant 22-year-old male who comes in today for repeat evaluation of the right knee. He recently had an MRI of the knee and comes in today to discuss results.   The patient reports he has difficulty going up and down stairs and standing for a lo

## 2019-02-05 ENCOUNTER — TELEPHONE (OUTPATIENT)
Dept: ORTHOPEDICS CLINIC | Facility: CLINIC | Age: 59
End: 2019-02-05

## 2019-02-05 NOTE — TELEPHONE ENCOUNTER
Pt brought short term disability forms to  [facility] to be completed by Dr Joaquin Stauffer. Pt signed HIPPA and pd $25 fee. Pt would like to  completed form at [facility]. Scanned to [employer] and brought original to [employer] @ [facility].

## 2019-02-06 NOTE — TELEPHONE ENCOUNTER
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 Logged for processing.  NK

## 2019-02-13 RX ORDER — METOPROLOL SUCCINATE 100 MG/1
150 TABLET, EXTENDED RELEASE ORAL DAILY
Qty: 135 TABLET | Refills: 0 | Status: SHIPPED | OUTPATIENT
Start: 2019-02-13 | End: 2019-10-28

## 2019-02-20 NOTE — TELEPHONE ENCOUNTER
Dr. Blaze Ramirez,    Please sign off on form:  -Highlight the patient and hit \"Chart\" button. -In Chart Review, w/in the Encounter tab - click 1 time on the Telephone call encounter for 2/5/19.  Scroll down the telephone encounter.  -Click \"scan on\" blue Hy

## 2019-02-21 NOTE — TELEPHONE ENCOUNTER
Spoke to patient stating form is completed and ready for . He will be going to [facility] check in desk. Please print out form dated 2/5/19.  SC

## 2019-03-01 ENCOUNTER — TELEPHONE (OUTPATIENT)
Dept: INTERNAL MEDICINE CLINIC | Facility: CLINIC | Age: 59
End: 2019-03-01

## 2019-03-01 ENCOUNTER — OFFICE VISIT (OUTPATIENT)
Dept: ORTHOPEDICS CLINIC | Facility: CLINIC | Age: 59
End: 2019-03-01
Payer: COMMERCIAL

## 2019-03-01 DIAGNOSIS — S83.281A ACUTE LATERAL MENISCUS TEAR OF RIGHT KNEE, INITIAL ENCOUNTER: Primary | ICD-10-CM

## 2019-03-01 PROCEDURE — 99212 OFFICE O/P EST SF 10 MIN: CPT | Performed by: ORTHOPAEDIC SURGERY

## 2019-03-01 PROCEDURE — 99213 OFFICE O/P EST LOW 20 MIN: CPT | Performed by: ORTHOPAEDIC SURGERY

## 2019-03-01 NOTE — H&P
3/1/2019  Angeline Evans  15/1960  62year old   male  Min Arredondo MD    HPI:   Patient presents with:  Knee Pain: Right -- Rates pain 5/10. States knee feels swollen.      This is a pleasant 60-year-old male who comes in today for repeat evaluation o ARTHROSCOPY Bilateral     knee injuries   • LAPAROSCOPY, SURGICAL; NEPHRECTOMY Right April 2015    Robotic surgery at [facility]   • OTHER SURGICAL HISTORY      Microdiscectomy L4-5; good outcome, Completed at [facility] (Disc displacement)   • RECTUM SURGE risks, indications, benefits, and procedures of both operative and non-operative treatment were discussed with the patient. This is a pleasant 51-year-old male that sustained right knee lateral meniscus tear.   He also has full-thickness chondral defects

## 2019-03-01 NOTE — TELEPHONE ENCOUNTER
Current Outpatient Medications:  LOSARTAN 100 MG Oral Tab TAKE 1 TABLET (100 MG TOTAL) BY MOUTH ONCE DAILY. Disp: 90 tablet Rfl: 2     Patient states this medication is on recall and \"causes cancer. \" Patient stated he didn't take it this morning and wo

## 2019-03-01 NOTE — TELEPHONE ENCOUNTER
REYNA Aburto  Pt was called and he stated that he saw on the news about the recall on losartan. I asked pt to please call his pharmacy and they would inform him. Pt did not feel he needed to call them that Dr. Gabriel Esteban should know.  I explained to the pt t

## 2019-03-01 NOTE — H&P (VIEW-ONLY)
3/1/2019  Tiki [place]  15/1960  62year old   male  Rena Blanchard MD    HPI:   Patient presents with:  Knee Pain: Right -- Rates pain 5/10. States knee feels swollen.      This is a pleasant 49-year-old male who comes in today for repeat evaluation o ARTHROSCOPY Bilateral     knee injuries   • LAPAROSCOPY, SURGICAL; NEPHRECTOMY Right April 2015    Robotic surgery at [facility] 3   • OTHER SURGICAL HISTORY      Microdiscectomy L4-5; good outcome, Completed at [facility] (Disc displacement)   • RECTUM SURGE risks, indications, benefits, and procedures of both operative and non-operative treatment were discussed with the patient. This is a pleasant 60-year-old male that sustained right knee lateral meniscus tear.   He also has full-thickness chondral defects

## 2019-03-02 RX ORDER — LOSARTAN POTASSIUM 100 MG/1
100 TABLET ORAL
Qty: 90 TABLET | Refills: 1 | Status: SHIPPED | OUTPATIENT
Start: 2019-03-02 | End: 2019-05-17

## 2019-03-02 NOTE — TELEPHONE ENCOUNTER
JSK please review allergy contraindication per Epic/pharmacy alert, thanks    No reactions specified. No reaction type specified. User documented allergy severity: High. Level 3 with LISINOPRIL-HYDROCHLOROTHIAZIDE (Class: ACE INHIBITORS).    \"Other react TP 7.1 10/22/2018    ALB 4.3 10/22/2018     10/22/2018    K 4.2 10/22/2018     10/22/2018    CO2 28 10/22/2018    GLOBULIN 2.6 06/18/2018    AGRATIO 1.2 09/19/2016    ANIONGAP 5 06/18/2018    OSMOCALC 284 06/18/2018

## 2019-03-05 ENCOUNTER — TELEPHONE (OUTPATIENT)
Dept: ORTHOPEDICS CLINIC | Facility: CLINIC | Age: 59
End: 2019-03-05

## 2019-03-05 NOTE — TELEPHONE ENCOUNTER
Pt brought Short Term Disability Continuation Forms to  [facility] for Dr Tianna Longoria to complete. Pt already signed HIPPA and pd $25 fee. Did not colect additional fee but pt is aware there may be one. Scanned to [employer] and brought originals to [facility] @ [facility]

## 2019-03-06 ENCOUNTER — TELEPHONE (OUTPATIENT)
Dept: ORTHOPEDICS CLINIC | Facility: CLINIC | Age: 59
End: 2019-03-06

## 2019-03-06 NOTE — TELEPHONE ENCOUNTER
Call to [address] for surgery authorization  Surgery with Dr. Joaquin Stauffer  3-20-19  Right knee arthroscopy, Partial lateral meniscectomy  Out patient  [facility]  [address] CPT  Diagnosis code T41.907S    Spoke to abdirizak Burrell   No prior authorzation

## 2019-03-13 RX ORDER — CHLORAL HYDRATE 500 MG
1000 CAPSULE ORAL DAILY
COMMUNITY
End: 2021-02-01

## 2019-03-16 ENCOUNTER — OFFICE VISIT (OUTPATIENT)
Dept: INTERNAL MEDICINE CLINIC | Facility: CLINIC | Age: 59
End: 2019-03-16
Payer: COMMERCIAL

## 2019-03-16 ENCOUNTER — LAB ENCOUNTER (OUTPATIENT)
Dept: LAB | Facility: HOSPITAL | Age: 59
End: 2019-03-16
Attending: INTERNAL MEDICINE
Payer: COMMERCIAL

## 2019-03-16 VITALS
DIASTOLIC BLOOD PRESSURE: 80 MMHG | SYSTOLIC BLOOD PRESSURE: 150 MMHG | BODY MASS INDEX: 27.67 KG/M2 | HEIGHT: 69 IN | WEIGHT: 186.81 LBS | RESPIRATION RATE: 20 BRPM | HEART RATE: 64 BPM | TEMPERATURE: 99 F

## 2019-03-16 DIAGNOSIS — M25.561 RIGHT KNEE PAIN, UNSPECIFIED CHRONICITY: ICD-10-CM

## 2019-03-16 DIAGNOSIS — Z01.818 OTHER SPECIFIED PRE-OPERATIVE EXAMINATION: Primary | ICD-10-CM

## 2019-03-16 DIAGNOSIS — Z01.818 PRE-OP EXAMINATION: Primary | ICD-10-CM

## 2019-03-16 DIAGNOSIS — Z85.528 HISTORY OF RENAL CELL CANCER: ICD-10-CM

## 2019-03-16 DIAGNOSIS — I10 ESSENTIAL HYPERTENSION WITH GOAL BLOOD PRESSURE LESS THAN 140/90: ICD-10-CM

## 2019-03-16 PROCEDURE — 99212 OFFICE O/P EST SF 10 MIN: CPT | Performed by: INTERNAL MEDICINE

## 2019-03-16 PROCEDURE — 93010 ELECTROCARDIOGRAM REPORT: CPT | Performed by: INTERNAL MEDICINE

## 2019-03-16 PROCEDURE — 99214 OFFICE O/P EST MOD 30 MIN: CPT | Performed by: INTERNAL MEDICINE

## 2019-03-16 PROCEDURE — 93005 ELECTROCARDIOGRAM TRACING: CPT

## 2019-03-16 NOTE — PROGRESS NOTES
HPI:    Patient ID: Ernesto Brown is a 61year old male. HPI  Patient is here for preoperative evaluation prior to undergoing right knee arthroscopy with partial lateral meniscectomy.   This is to be done on March 20 under the guidance of Dr. Bia Ruiz.  P knee injuries   • LAPAROSCOPY, SURGICAL; NEPHRECTOMY Right April 2015    Robotic surgery at [facility] 3   • OTHER SURGICAL HISTORY      Microdiscectomy L4-5; good outcome, Completed at [facility] (Disc displacement)   • RECTUM SURGERY PROCEDURE UNLISTED  199 Tablet 24 Hr Take 1.5 tablets (150 mg total) by mouth daily. Disp: 135 tablet Rfl: 0   TAMSULOSIN HCL 0.4 MG Oral Cap TAKE 1 CAPSULE (0.4 MG TOTAL) BY MOUTH DAILY.  Disp: 90 capsule Rfl: 1   AMLODIPINE BESYLATE 10 MG Oral Tab TAKE 1 TABLET (10 MG TOTAL) BY Continue the blood pressure medications until the surgery. - EKG 12-LEAD    2. Right knee pain, unspecified chronicity  As above. 3. Essential hypertension with goal blood pressure less than 140/90  Mild elevation of blood pressure reading today.   He i

## 2019-03-20 ENCOUNTER — ANESTHESIA EVENT (OUTPATIENT)
Dept: SURGERY | Facility: HOSPITAL | Age: 59
End: 2019-03-20
Payer: COMMERCIAL

## 2019-03-20 ENCOUNTER — ANESTHESIA (OUTPATIENT)
Dept: SURGERY | Facility: HOSPITAL | Age: 59
End: 2019-03-20
Payer: COMMERCIAL

## 2019-03-20 ENCOUNTER — HOSPITAL ENCOUNTER (OUTPATIENT)
Facility: HOSPITAL | Age: 59
Setting detail: HOSPITAL OUTPATIENT SURGERY
Discharge: HOME | End: 2019-03-20
Attending: ORTHOPAEDIC SURGERY | Admitting: ORTHOPAEDIC SURGERY
Payer: COMMERCIAL

## 2019-03-20 VITALS
DIASTOLIC BLOOD PRESSURE: 73 MMHG | TEMPERATURE: 98 F | RESPIRATION RATE: 16 BRPM | HEIGHT: 69 IN | HEART RATE: 59 BPM | WEIGHT: 185 LBS | SYSTOLIC BLOOD PRESSURE: 138 MMHG | OXYGEN SATURATION: 100 % | BODY MASS INDEX: 27.4 KG/M2

## 2019-03-20 DIAGNOSIS — S83.281A ACUTE LATERAL MENISCUS TEAR OF RIGHT KNEE, INITIAL ENCOUNTER: ICD-10-CM

## 2019-03-20 PROCEDURE — 94010 BREATHING CAPACITY TEST: CPT | Performed by: ORTHOPAEDIC SURGERY

## 2019-03-20 PROCEDURE — 0SBC4ZZ EXCISION OF RIGHT KNEE JOINT, PERCUTANEOUS ENDOSCOPIC APPROACH: ICD-10-PCS | Performed by: ORTHOPAEDIC SURGERY

## 2019-03-20 RX ORDER — EPHEDRINE SULFATE 50 MG/ML
INJECTION, SOLUTION INTRAVENOUS AS NEEDED
Status: DISCONTINUED | OUTPATIENT
Start: 2019-03-20 | End: 2019-03-20 | Stop reason: SURG

## 2019-03-20 RX ORDER — ASPIRIN 325 MG
325 TABLET ORAL DAILY
Qty: 14 TABLET | Refills: 0 | Status: SHIPPED | OUTPATIENT
Start: 2019-03-20 | End: 2019-04-19 | Stop reason: ALTCHOICE

## 2019-03-20 RX ORDER — CEFAZOLIN SODIUM/WATER 2 G/20 ML
2 SYRINGE (ML) INTRAVENOUS ONCE
Status: COMPLETED | OUTPATIENT
Start: 2019-03-20 | End: 2019-03-20

## 2019-03-20 RX ORDER — BUPIVACAINE HYDROCHLORIDE AND EPINEPHRINE 2.5; 5 MG/ML; UG/ML
INJECTION, SOLUTION INFILTRATION; PERINEURAL AS NEEDED
Status: DISCONTINUED | OUTPATIENT
Start: 2019-03-20 | End: 2019-03-20 | Stop reason: HOSPADM

## 2019-03-20 RX ORDER — METOCLOPRAMIDE 10 MG/1
10 TABLET ORAL ONCE
Status: DISCONTINUED | OUTPATIENT
Start: 2019-03-20 | End: 2019-03-20 | Stop reason: HOSPADM

## 2019-03-20 RX ORDER — ACETAMINOPHEN 500 MG
1000 TABLET ORAL ONCE
Status: COMPLETED | OUTPATIENT
Start: 2019-03-20 | End: 2019-03-20

## 2019-03-20 RX ORDER — SODIUM CHLORIDE, SODIUM LACTATE, POTASSIUM CHLORIDE, CALCIUM CHLORIDE 600; 310; 30; 20 MG/100ML; MG/100ML; MG/100ML; MG/100ML
INJECTION, SOLUTION INTRAVENOUS CONTINUOUS
Status: DISCONTINUED | OUTPATIENT
Start: 2019-03-20 | End: 2019-03-20

## 2019-03-20 RX ORDER — ONDANSETRON 2 MG/ML
4 INJECTION INTRAMUSCULAR; INTRAVENOUS EVERY 6 HOURS PRN
Status: DISCONTINUED | OUTPATIENT
Start: 2019-03-20 | End: 2019-03-20

## 2019-03-20 RX ORDER — HYDROCODONE BITARTRATE AND ACETAMINOPHEN 5; 325 MG/1; MG/1
1 TABLET ORAL EVERY 6 HOURS PRN
Qty: 10 TABLET | Refills: 0 | Status: SHIPPED | OUTPATIENT
Start: 2019-03-20 | End: 2019-04-19

## 2019-03-20 RX ORDER — DEXAMETHASONE SODIUM PHOSPHATE 4 MG/ML
VIAL (ML) INJECTION AS NEEDED
Status: DISCONTINUED | OUTPATIENT
Start: 2019-03-20 | End: 2019-03-20 | Stop reason: SURG

## 2019-03-20 RX ORDER — MIDAZOLAM HYDROCHLORIDE 1 MG/ML
INJECTION INTRAMUSCULAR; INTRAVENOUS AS NEEDED
Status: DISCONTINUED | OUTPATIENT
Start: 2019-03-20 | End: 2019-03-20 | Stop reason: SURG

## 2019-03-20 RX ORDER — GLYCOPYRROLATE 0.2 MG/ML
INJECTION INTRAMUSCULAR; INTRAVENOUS AS NEEDED
Status: DISCONTINUED | OUTPATIENT
Start: 2019-03-20 | End: 2019-03-20 | Stop reason: SURG

## 2019-03-20 RX ORDER — ONDANSETRON 2 MG/ML
INJECTION INTRAMUSCULAR; INTRAVENOUS AS NEEDED
Status: DISCONTINUED | OUTPATIENT
Start: 2019-03-20 | End: 2019-03-20 | Stop reason: SURG

## 2019-03-20 RX ORDER — ROCURONIUM BROMIDE 10 MG/ML
INJECTION, SOLUTION INTRAVENOUS AS NEEDED
Status: DISCONTINUED | OUTPATIENT
Start: 2019-03-20 | End: 2019-03-20 | Stop reason: SURG

## 2019-03-20 RX ORDER — MAGNESIUM HYDROXIDE 1200 MG/15ML
LIQUID ORAL CONTINUOUS PRN
Status: COMPLETED | OUTPATIENT
Start: 2019-03-20 | End: 2019-03-20

## 2019-03-20 RX ORDER — LIDOCAINE HYDROCHLORIDE 10 MG/ML
INJECTION, SOLUTION EPIDURAL; INFILTRATION; INTRACAUDAL; PERINEURAL AS NEEDED
Status: DISCONTINUED | OUTPATIENT
Start: 2019-03-20 | End: 2019-03-20 | Stop reason: SURG

## 2019-03-20 RX ORDER — FAMOTIDINE 20 MG/1
20 TABLET ORAL ONCE
Status: DISCONTINUED | OUTPATIENT
Start: 2019-03-20 | End: 2019-03-20 | Stop reason: HOSPADM

## 2019-03-20 RX ADMIN — ROCURONIUM BROMIDE 5 MG: 10 INJECTION, SOLUTION INTRAVENOUS at 10:48:00

## 2019-03-20 RX ADMIN — LIDOCAINE HYDROCHLORIDE 50 MG: 10 INJECTION, SOLUTION EPIDURAL; INFILTRATION; INTRACAUDAL; PERINEURAL at 10:47:00

## 2019-03-20 RX ADMIN — EPHEDRINE SULFATE 10 MG: 50 INJECTION, SOLUTION INTRAVENOUS at 11:10:00

## 2019-03-20 RX ADMIN — MIDAZOLAM HYDROCHLORIDE 2 MG: 1 INJECTION INTRAMUSCULAR; INTRAVENOUS at 10:42:00

## 2019-03-20 RX ADMIN — SODIUM CHLORIDE, SODIUM LACTATE, POTASSIUM CHLORIDE, CALCIUM CHLORIDE: 600; 310; 30; 20 INJECTION, SOLUTION INTRAVENOUS at 10:40:00

## 2019-03-20 RX ADMIN — CEFAZOLIN SODIUM/WATER 2 G: 2 G/20 ML SYRINGE (ML) INTRAVENOUS at 10:58:00

## 2019-03-20 RX ADMIN — DEXAMETHASONE SODIUM PHOSPHATE 4 MG: 4 MG/ML VIAL (ML) INJECTION at 10:45:00

## 2019-03-20 RX ADMIN — ONDANSETRON 4 MG: 2 INJECTION INTRAMUSCULAR; INTRAVENOUS at 11:00:00

## 2019-03-20 RX ADMIN — GLYCOPYRROLATE 0.2 MG: 0.2 INJECTION INTRAMUSCULAR; INTRAVENOUS at 10:40:00

## 2019-03-20 RX ADMIN — SODIUM CHLORIDE, SODIUM LACTATE, POTASSIUM CHLORIDE, CALCIUM CHLORIDE: 600; 310; 30; 20 INJECTION, SOLUTION INTRAVENOUS at 11:00:00

## 2019-03-20 NOTE — INTERVAL H&P NOTE
Pre-op Diagnosis: Acute lateral meniscus tear of right knee, initial encounter [S83.281A]    The above referenced H&P was reviewed by Calixto Fernandez MD on 3/20/2019, the patient was examined and no significant changes have occurred in the patient's co

## 2019-03-20 NOTE — ANESTHESIA POSTPROCEDURE EVALUATION
Patient: Adamaris Boyd    Procedure Summary     Date:  03/20/19 Room / Location:  [address] [address] MAIN OR 06 / [address] [place] [address] OR    Anesthesia Start:  1516 Anesthesia Stop:      Procedure:  KNEE ARTHROSCOPY (Right ) Diagnosis:       Acute lateral meniscus tear of right knee

## 2019-03-20 NOTE — ANESTHESIA PROCEDURE NOTES
ANESTHESIA INTUBATION  Date/Time: 3/20/2019 10:52 AM  Urgency: elective    Airway not difficult    General Information and Staff    Patient location during procedure: OR  Anesthesiologist: Rivka Valdes MD  Resident/CRNA: ALEX Subramanian

## 2019-03-20 NOTE — BRIEF OP NOTE
Pre-Operative Diagnosis: Acute lateral meniscus tear of right knee, initial encounter [S83.281A]     Post-Operative Diagnosis: Acute lateral meniscus tear of right knee, initial encounter [F33.439U]      Procedure Performed:   Procedure(s):  RIGHT KNEE ART

## 2019-03-20 NOTE — ANESTHESIA PREPROCEDURE EVALUATION
Anesthesia PreOp Note    HPI:     Estefany Ashraf is a 61year old male who presents for preoperative consultation requested by: Renata Carter MD    Date of Surgery: 3/20/2019    Procedure(s):  KNEE ARTHROSCOPY  Indication: Acute lateral meniscus tear PROCEDURE UNLISTED      lumbar microdiscectomy   • TONSILLECTOMY      tonstillitis 1993   • UPPER GI ENDOSCOPY,EXAM           Medications Prior to Admission:  omega-3 fatty acids 1000 MG Oral Cap Take 1,000 mg by mouth daily.  Disp:  Rfl:  Past Week at [facility] Arthritis Mother         gout     Social History    Socioeconomic History      Marital status:       Spouse name: Not on file      Number of children: 2      Years of education: Not on file      Highest education level: Not on file    Occupational H Concern: Not Asked        Special Diet: Not Asked        Back Care: Not Asked        Exercise: Not Asked        Bike Helmet: Not Asked        Seat Belt: Not Asked        Self-Exams: Not Asked    Social History Narrative      Not on file      Available pre-

## 2019-03-20 NOTE — OPERATIVE REPORT
[facility]    PATIENT'S NAME: RICARDO LUCIEN D   ATTENDING PHYSICIAN: Nisha Pretty MD   OPERATING PHYSICIAN: Nisha Pretty MD   PATIENT ACCOUNT#:   [id number]    LOCATION:  [address] 10  MEDICAL RECORD #:   [id number]       DATE OF B There were no loose bodies in the medial or lateral gutter. The medial compartment was entered, and there was no significant articular cartilage damage. There was no medial meniscus tear visualized or probed.   The ACL was seen, tensioned, and found to b

## 2019-03-22 ENCOUNTER — OFFICE VISIT (OUTPATIENT)
Dept: ORTHOPEDICS CLINIC | Facility: CLINIC | Age: 59
End: 2019-03-22
Payer: COMMERCIAL

## 2019-03-22 VITALS — HEART RATE: 71 BPM | SYSTOLIC BLOOD PRESSURE: 142 MMHG | DIASTOLIC BLOOD PRESSURE: 84 MMHG | RESPIRATION RATE: 16 BRPM

## 2019-03-22 DIAGNOSIS — S83.281A ACUTE LATERAL MENISCUS TEAR OF RIGHT KNEE, INITIAL ENCOUNTER: Primary | ICD-10-CM

## 2019-03-22 PROCEDURE — 20610 DRAIN/INJ JOINT/BURSA W/O US: CPT | Performed by: ORTHOPAEDIC SURGERY

## 2019-03-22 NOTE — PROGRESS NOTES
This is a pleasant 17-year-old male that is 2 days status post right knee arthroscopy and partial lateral meniscectomy. The patient has had no chest pain or shortness of breath. He comes in today for evaluation. He is coming by his wife.     On exam, the

## 2019-03-22 NOTE — PROCEDURES
After sterile prep, 55 cc of old blood was evacuated from the right knee. The patient tolerated procedure well.

## 2019-04-17 NOTE — TELEPHONE ENCOUNTER
Dr. Mindy Olivier,    Please sign off on form:  -Highlight the patient and hit \"Chart\" button. -In Chart Review, w/in the Encounter tab - click 1 time on the Telephone call encounter for 3/5/19.  Scroll down the telephone encounter.  -Click \"scan on\" blue Hy

## 2019-04-19 ENCOUNTER — OFFICE VISIT (OUTPATIENT)
Dept: ORTHOPEDICS CLINIC | Facility: CLINIC | Age: 59
End: 2019-04-19
Payer: COMMERCIAL

## 2019-04-19 DIAGNOSIS — S83.281A ACUTE LATERAL MENISCUS TEAR OF RIGHT KNEE, INITIAL ENCOUNTER: Primary | ICD-10-CM

## 2019-04-19 PROCEDURE — 99024 POSTOP FOLLOW-UP VISIT: CPT | Performed by: ORTHOPAEDIC SURGERY

## 2019-04-19 PROCEDURE — 99212 OFFICE O/P EST SF 10 MIN: CPT | Performed by: ORTHOPAEDIC SURGERY

## 2019-04-19 NOTE — PROGRESS NOTES
This is a pleasant 59-year-old male that is 4 weeks status post right knee arthroscopy and partial lateral meniscectomy. Patient reports feeling better than he did preoperatively. Patient reports he occasionally has swelling in the knee.   He has been per

## 2019-04-22 ENCOUNTER — OFFICE VISIT (OUTPATIENT)
Dept: INTERNAL MEDICINE CLINIC | Facility: CLINIC | Age: 59
End: 2019-04-22
Payer: COMMERCIAL

## 2019-04-22 VITALS
HEIGHT: 69 IN | RESPIRATION RATE: 20 BRPM | BODY MASS INDEX: 28.38 KG/M2 | SYSTOLIC BLOOD PRESSURE: 132 MMHG | WEIGHT: 191.63 LBS | DIASTOLIC BLOOD PRESSURE: 74 MMHG | HEART RATE: 60 BPM | TEMPERATURE: 99 F

## 2019-04-22 DIAGNOSIS — I10 ESSENTIAL HYPERTENSION WITH GOAL BLOOD PRESSURE LESS THAN 140/90: Primary | ICD-10-CM

## 2019-04-22 DIAGNOSIS — R94.31 ABNORMAL EKG: ICD-10-CM

## 2019-04-22 PROCEDURE — 99212 OFFICE O/P EST SF 10 MIN: CPT | Performed by: INTERNAL MEDICINE

## 2019-04-22 PROCEDURE — 99213 OFFICE O/P EST LOW 20 MIN: CPT | Performed by: INTERNAL MEDICINE

## 2019-04-22 NOTE — PROGRESS NOTES
HPI:    Patient ID: Todd Vega is a 61year old male. HPI  Patient here for follow-up on hypertension and abnormal EKG. Last seen here about 1 month ago prior to undergoing right knee procedure. The knee is doing much better.   Is about 80% back to PROCEDURE UNLISTED  1994    repair of rectal tear   • SPINE SURGERY PROCEDURE UNLISTED      lumbar microdiscectomy   • TONSILLECTOMY      tonstillitis 1993   • UPPER GI ENDOSCOPY,EXAM        Family History   Problem Relation Age of Onset   • Cancer Father Disp: 90 capsule Rfl: 1   AMLODIPINE BESYLATE 10 MG Oral Tab TAKE 1 TABLET (10 MG TOTAL) BY MOUTH ONCE DAILY. Disp: 90 tablet Rfl: 2     Allergies:  Lisinopril-Hydrochl*        Comment:Other reaction(s):  Other             Severe leg cramps and excessive ur

## 2019-05-01 RX ORDER — AMLODIPINE BESYLATE 10 MG/1
10 TABLET ORAL
Qty: 90 TABLET | Refills: 1 | Status: SHIPPED | OUTPATIENT
Start: 2019-05-01 | End: 2019-10-25

## 2019-05-17 ENCOUNTER — PATIENT MESSAGE (OUTPATIENT)
Dept: INTERNAL MEDICINE CLINIC | Facility: CLINIC | Age: 59
End: 2019-05-17

## 2019-05-17 ENCOUNTER — TELEPHONE (OUTPATIENT)
Dept: OTHER | Age: 59
End: 2019-05-17

## 2019-05-17 RX ORDER — LOSARTAN POTASSIUM 100 MG/1
100 TABLET ORAL
Qty: 90 TABLET | Refills: 1 | Status: SHIPPED | OUTPATIENT
Start: 2019-05-17 | End: 2020-02-04

## 2019-05-17 NOTE — TELEPHONE ENCOUNTER
Please check if he is tolerating it ok? . He has been on it for long on it since 2016.  I have approved the refill  Please note Dr Kathy Coleman is in a different call group

## 2019-05-17 NOTE — TELEPHONE ENCOUNTER
Patient sent [employer] message below. Please advise. Message routed to provider on call.

## 2019-05-17 NOTE — TELEPHONE ENCOUNTER
Cannot refill Losartan due to red warning; Allergy/contraindication to Lisinopril. Pended for review.

## 2019-05-17 NOTE — TELEPHONE ENCOUNTER
Stop the metoprolol the day before and on the day of the test.  May take it after completing the test.

## 2019-05-17 NOTE — TELEPHONE ENCOUNTER
From: Elena Matta  To: Jaclyn Painter MD  Sent: 5/17/2019 8:37 AM CDT  Subject: Prescription Question    I have a nuclear stress test 5/20/19, 9am, which med if any should I not take, when should I stop and when is it safe to take again?

## 2019-05-17 NOTE — TELEPHONE ENCOUNTER
----- Message from Magalys Michelle.  Aissatou sent at 5/17/2019  8:37 AM CDT -----  Regarding: Prescription Question  Contact: [phone]  I have a nuclear stress test 5/20/19, 9am, which med if any should I not take, when should I stop and when is it safe to take a

## 2019-05-18 NOTE — TELEPHONE ENCOUNTER
Noted mychart message was read. No further action is needed. Dayana Griffin, RN   to Thirza Model            5/17/19 2:59 PM   Renetta Ramirez,     Dr. Ileana Jackson is out of the office. Dr. Nasreen Oshea is covering for Dr. Ileana Jackson.  Per Dr. Nasreen Oshea, stop the metoprolol

## 2019-05-18 NOTE — TELEPHONE ENCOUNTER
LMTCB. Losartan refill was flagged due to red warning; Allergy/contraindication to Lisinopril. Medication refill was sent to MD on-call and approved. MD on-call would like to check with patient and make sure he is tolerating med. Chart review shows patient has been on Losartan since 2016.

## 2019-05-20 ENCOUNTER — HOSPITAL ENCOUNTER (OUTPATIENT)
Dept: NUCLEAR MEDICINE | Facility: HOSPITAL | Age: 59
Discharge: HOME | End: 2019-05-20
Attending: INTERNAL MEDICINE
Payer: COMMERCIAL

## 2019-05-20 ENCOUNTER — HOSPITAL ENCOUNTER (OUTPATIENT)
Dept: CV DIAGNOSTICS | Facility: HOSPITAL | Age: 59
Discharge: HOME | End: 2019-05-20
Attending: INTERNAL MEDICINE
Payer: COMMERCIAL

## 2019-05-20 DIAGNOSIS — R94.31 ABNORMAL EKG: ICD-10-CM

## 2019-05-20 DIAGNOSIS — I10 ESSENTIAL HYPERTENSION WITH GOAL BLOOD PRESSURE LESS THAN 140/90: ICD-10-CM

## 2019-05-20 PROCEDURE — 93016 CV STRESS TEST SUPVJ ONLY: CPT | Performed by: INTERNAL MEDICINE

## 2019-05-20 PROCEDURE — 93018 CV STRESS TEST I&R ONLY: CPT | Performed by: INTERNAL MEDICINE

## 2019-05-20 PROCEDURE — 93017 CV STRESS TEST TRACING ONLY: CPT | Performed by: INTERNAL MEDICINE

## 2019-05-20 PROCEDURE — 78452 HT MUSCLE IMAGE SPECT MULT: CPT | Performed by: INTERNAL MEDICINE

## 2019-05-20 RX ORDER — 0.9 % SODIUM CHLORIDE 0.9 %
VIAL (ML) INJECTION
Status: COMPLETED
Start: 2019-05-20 | End: 2019-05-20

## 2019-05-20 RX ADMIN — 0.9 % SODIUM CHLORIDE: 0.9 % VIAL (ML) INJECTION at 10:56:00

## 2019-06-14 ENCOUNTER — HOSPITAL ENCOUNTER (OUTPATIENT)
Dept: GENERAL RADIOLOGY | Facility: HOSPITAL | Age: 59
Discharge: HOME | End: 2019-06-14
Attending: UROLOGY
Payer: COMMERCIAL

## 2019-06-14 DIAGNOSIS — C64.1 MALIGNANT NEOPLASM OF RIGHT KIDNEY (HCC): ICD-10-CM

## 2019-06-14 PROCEDURE — 71046 X-RAY EXAM CHEST 2 VIEWS: CPT | Performed by: UROLOGY

## 2019-06-17 ENCOUNTER — OFFICE VISIT (OUTPATIENT)
Dept: SURGERY | Facility: CLINIC | Age: 59
End: 2019-06-17
Payer: COMMERCIAL

## 2019-06-17 VITALS
WEIGHT: 191 LBS | SYSTOLIC BLOOD PRESSURE: 125 MMHG | DIASTOLIC BLOOD PRESSURE: 77 MMHG | HEART RATE: 80 BPM | BODY MASS INDEX: 28 KG/M2

## 2019-06-17 DIAGNOSIS — C64.1 MALIGNANT NEOPLASM OF RIGHT KIDNEY (HCC): Primary | ICD-10-CM

## 2019-06-17 DIAGNOSIS — N40.1 BENIGN PROSTATIC HYPERPLASIA WITH LOWER URINARY TRACT SYMPTOMS, SYMPTOM DETAILS UNSPECIFIED: ICD-10-CM

## 2019-06-17 PROCEDURE — 99212 OFFICE O/P EST SF 10 MIN: CPT | Performed by: UROLOGY

## 2019-06-17 PROCEDURE — 99213 OFFICE O/P EST LOW 20 MIN: CPT | Performed by: UROLOGY

## 2019-06-17 NOTE — PROGRESS NOTES
Marc Gerard is a 61year old male. HPI:   Patient presents with:   Follow - Up: patient presents for 6 mo f/u here to discuss results      59-year-old male with a history of renal cell cancer pathologic T1 a papillary stage I status post laparoscopic r Right April 2015    [facility]otic surgery at [facility]   • OTHER SURGICAL HISTORY      Microdiscectomy L4-5; good outcome, Completed at [facility] (Disc displacement)   • RECTUM SURGERY PROCEDURE UNLISTED  1994    repair of rectal tear   • SPINE SURGERY PROCEDUR diagnosis)    Follow-up in 1 year. –CBC, CMP and PSA to be done today.   – Chest x-ray prior to next visit in 1 year         Orders This Visit:  Orders Placed This Encounter      Comp Metabolic Panel (14)      CBC W Differential W Platelet      Meds This V

## 2019-07-04 RX ORDER — TAMSULOSIN HYDROCHLORIDE 0.4 MG/1
0.4 CAPSULE ORAL DAILY
Qty: 90 CAPSULE | Refills: 1 | Status: SHIPPED | OUTPATIENT
Start: 2019-07-04 | End: 2020-02-10

## 2019-07-04 NOTE — TELEPHONE ENCOUNTER
Review pended refill request as it does not fall under a protocol.     Requested Prescriptions     Pending Prescriptions Disp Refills   • TAMSULOSIN HCL 0.4 MG Oral Cap [Pharmacy Med Name: TAMSULOSIN HCL 0.4 MG CAPSULE] 90 capsule 1     Sig: TAKE 1 CAPSULE

## 2019-08-08 RX ORDER — METOPROLOL SUCCINATE 100 MG/1
150 TABLET, EXTENDED RELEASE ORAL DAILY
Qty: 135 TABLET | Refills: 1 | Status: SHIPPED | OUTPATIENT
Start: 2019-08-08 | End: 2019-10-28

## 2019-08-08 NOTE — TELEPHONE ENCOUNTER
Refill passed per [facility] protocol.     Hypertensive Medications  Protocol Criteria:  · Appointment scheduled in the past 6 months or in the next 3 months  · BMP or CMP in the past 12 months  · Creatinine result < 2  Recent Outpatient Visits

## 2019-10-14 ENCOUNTER — PATIENT MESSAGE (OUTPATIENT)
Dept: SURGERY | Facility: CLINIC | Age: 59
End: 2019-10-14

## 2019-10-16 NOTE — TELEPHONE ENCOUNTER
Verified original orders were ordered to [employer].  Printed out orders and sent to patient and replied to [employer]

## 2019-10-16 NOTE — TELEPHONE ENCOUNTER
From: Lord Gastelum  To: Tarun Howell MD  Sent: 10/14/2019 10:15 AM CDT  Subject: Other    Hello, I need my blood work orders to made out to [email or web address] please.

## 2019-10-26 RX ORDER — AMLODIPINE BESYLATE 10 MG/1
10 TABLET ORAL
Qty: 90 TABLET | Refills: 1 | Status: SHIPPED | OUTPATIENT
Start: 2019-10-26 | End: 2020-04-18

## 2019-10-27 NOTE — TELEPHONE ENCOUNTER
Refill passed per [facility] protocol.     Hypertensive Medications  Protocol Criteria:  · Appointment scheduled in the past 6 months or in the next 3 months  · BMP or CMP in the past 12 months  · Creatinine result < 2  Recent Outpatient Visits

## 2019-10-28 ENCOUNTER — OFFICE VISIT (OUTPATIENT)
Dept: INTERNAL MEDICINE CLINIC | Facility: CLINIC | Age: 59
End: 2019-10-28
Payer: COMMERCIAL

## 2019-10-28 VITALS
HEIGHT: 69 IN | SYSTOLIC BLOOD PRESSURE: 128 MMHG | HEART RATE: 70 BPM | WEIGHT: 188 LBS | BODY MASS INDEX: 27.85 KG/M2 | TEMPERATURE: 99 F | RESPIRATION RATE: 18 BRPM | DIASTOLIC BLOOD PRESSURE: 72 MMHG

## 2019-10-28 DIAGNOSIS — I10 ESSENTIAL HYPERTENSION WITH GOAL BLOOD PRESSURE LESS THAN 140/90: ICD-10-CM

## 2019-10-28 DIAGNOSIS — M19.90 OSTEOARTHRITIS, UNSPECIFIED OSTEOARTHRITIS TYPE, UNSPECIFIED SITE: ICD-10-CM

## 2019-10-28 DIAGNOSIS — R79.89 ABNORMAL TSH: ICD-10-CM

## 2019-10-28 DIAGNOSIS — Z00.00 ROUTINE PHYSICAL EXAMINATION: Primary | ICD-10-CM

## 2019-10-28 DIAGNOSIS — Z85.528 HISTORY OF KIDNEY CANCER: ICD-10-CM

## 2019-10-28 PROCEDURE — 99396 PREV VISIT EST AGE 40-64: CPT | Performed by: INTERNAL MEDICINE

## 2019-10-28 RX ORDER — METOPROLOL SUCCINATE 50 MG/1
50 TABLET, EXTENDED RELEASE ORAL DAILY
Qty: 90 TABLET | Refills: 3 | Status: SHIPPED | OUTPATIENT
Start: 2019-10-28 | End: 2020-10-19

## 2019-10-28 RX ORDER — METOPROLOL SUCCINATE 100 MG/1
100 TABLET, EXTENDED RELEASE ORAL DAILY
Qty: 90 TABLET | Refills: 3 | Status: SHIPPED | OUTPATIENT
Start: 2019-10-28 | End: 2020-10-19

## 2019-10-28 NOTE — PROGRESS NOTES
HPI:    Patient ID: Amy Malloy is a 61year old male. HPI  Patient is here requesting a physical exam and follow-up on chronic medical problems as listed below. Last seen here in April for follow-up after his right knee procedure.   Also with an abno reading glasses      Past Surgical History:   Procedure Laterality Date   • COLONOSCOPY     • KNEE ARTHROSCOPY Bilateral     knee injuries   • KNEE ARTHROSCOPY Right 3/20/2019    Performed by Margarito Jaime MD at [address] [place] [address] OR   • Taylor Berger 1  METOPROLOL SUCCINATE  MG Oral Tablet 24 Hr, TAKE 1.5 TABLETS (150 MG TOTAL) BY MOUTH DAILY. , Disp: 135 tablet, Rfl: 1  TAMSULOSIN HCL 0.4 MG Oral Cap, TAKE 1 CAPSULE (0.4 MG TOTAL) BY MOUTH DAILY. , Disp: 90 capsule, Rfl: 1  losartan 100 MG Oral Ta inguinal area. Genitourinary:    Testes, penis and rectum normal.   Prostate is enlarged. Uncircumcised. Musculoskeletal: He exhibits no tenderness. Lymphadenopathy:     He has no cervical adenopathy. Right: No inguinal adenopathy present.

## 2020-02-03 NOTE — TELEPHONE ENCOUNTER
Current Outpatient Medications:   ••  losartan 100 MG Oral Tab, Take 1 tablet (100 mg total) by mouth once daily. , Disp: 90 tablet, Rfl: 1  •

## 2020-02-04 RX ORDER — LOSARTAN POTASSIUM 100 MG/1
100 TABLET ORAL
Qty: 90 TABLET | Refills: 1 | Status: SHIPPED | OUTPATIENT
Start: 2020-02-04 | End: 2020-08-04

## 2020-02-05 NOTE — TELEPHONE ENCOUNTER
Refill passed per [facility] protocol.   Hypertensive Medications  Protocol Criteria:  · Appointment scheduled in the past 6 months or in the next 3 months  · BMP or CMP in the past 12 months  · Creatinine result < 2  Recent Outpatient Visits

## 2020-02-10 RX ORDER — TAMSULOSIN HYDROCHLORIDE 0.4 MG/1
CAPSULE ORAL
Qty: 90 CAPSULE | Refills: 1 | OUTPATIENT
Start: 2020-02-10

## 2020-02-11 RX ORDER — TAMSULOSIN HYDROCHLORIDE 0.4 MG/1
0.4 CAPSULE ORAL DAILY
Qty: 90 CAPSULE | Refills: 1 | Status: SHIPPED | OUTPATIENT
Start: 2020-02-11 | End: 2020-08-04

## 2020-02-11 NOTE — TELEPHONE ENCOUNTER
Review pended refill request as it does not fall under a protocol. Requested Prescriptions     Pending Prescriptions Disp Refills   • tamsulosin (FLOMAX) cap 90 capsule 1     Sig: Take 1 capsule (0.4 mg total) by mouth daily.          Recent Visits  Date T

## 2020-03-18 ENCOUNTER — TELEPHONE (OUTPATIENT)
Dept: INTERNAL MEDICINE CLINIC | Facility: CLINIC | Age: 60
End: 2020-03-18

## 2020-03-18 NOTE — TELEPHONE ENCOUNTER
Dr. Mery Rendon, patient is schedule for a follow up on 04/27/2020. Patient is requesting blood test order for appointment. Please advise.

## 2020-03-18 NOTE — TELEPHONE ENCOUNTER
Patient is requesting orders below through [employer]:    -Follow up labs to [facility]    Please call back with confirmation once approved and sent.

## 2020-03-18 NOTE — TELEPHONE ENCOUNTER
Left message for patient to call office back, please transfer him to ext. [phone] when he calls office back.

## 2020-03-18 NOTE — TELEPHONE ENCOUNTER
Patient had full blood test done back on October 21 of 2019. Things look normal at that time. Typically we only check his blood work once a year in October. I do not really see a reason to do it at this time.   He still needs to keep his appointment to tatum

## 2020-04-02 ENCOUNTER — PATIENT MESSAGE (OUTPATIENT)
Dept: INTERNAL MEDICINE CLINIC | Facility: CLINIC | Age: 60
End: 2020-04-02

## 2020-04-18 RX ORDER — AMLODIPINE BESYLATE 10 MG/1
10 TABLET ORAL
Qty: 90 TABLET | Refills: 1 | Status: SHIPPED | OUTPATIENT
Start: 2020-04-18 | End: 2020-10-11

## 2020-05-28 ENCOUNTER — E-VISIT (OUTPATIENT)
Dept: FAMILY MEDICINE CLINIC | Facility: CLINIC | Age: 60
End: 2020-05-28

## 2020-05-28 DIAGNOSIS — Z02.9 ENCOUNTERS FOR ADMINISTRATIVE PURPOSE: Primary | ICD-10-CM

## 2020-05-28 NOTE — PROGRESS NOTES
Pt did e-visit in error, wants to have a video visit and was told to do an e-check in. Explained to patient that there lupe be no charge.

## 2020-06-09 ENCOUNTER — HOSPITAL ENCOUNTER (OUTPATIENT)
Dept: GENERAL RADIOLOGY | Facility: HOSPITAL | Age: 60
Discharge: HOME | End: 2020-06-09
Attending: UROLOGY
Payer: COMMERCIAL

## 2020-06-09 DIAGNOSIS — C64.1 MALIGNANT NEOPLASM OF RIGHT KIDNEY (HCC): ICD-10-CM

## 2020-06-09 PROCEDURE — 71046 X-RAY EXAM CHEST 2 VIEWS: CPT | Performed by: UROLOGY

## 2020-07-13 ENCOUNTER — OFFICE VISIT (OUTPATIENT)
Dept: SURGERY | Facility: CLINIC | Age: 60
End: 2020-07-13
Payer: COMMERCIAL

## 2020-07-13 VITALS
BODY MASS INDEX: 28 KG/M2 | SYSTOLIC BLOOD PRESSURE: 131 MMHG | DIASTOLIC BLOOD PRESSURE: 76 MMHG | WEIGHT: 190 LBS | HEART RATE: 87 BPM

## 2020-07-13 DIAGNOSIS — C64.1 MALIGNANT NEOPLASM OF RIGHT KIDNEY (HCC): Primary | ICD-10-CM

## 2020-07-13 DIAGNOSIS — N40.1 BENIGN PROSTATIC HYPERPLASIA WITH LOWER URINARY TRACT SYMPTOMS, SYMPTOM DETAILS UNSPECIFIED: ICD-10-CM

## 2020-07-13 PROCEDURE — 99214 OFFICE O/P EST MOD 30 MIN: CPT | Performed by: UROLOGY

## 2020-07-13 NOTE — PROGRESS NOTES
Nola Betancourt is a 61year old male. HPI:   Patient presents with:  Kidney Problem: PT presents for yearly visit. PT states symptoms are stable. 61-year-old male in follow-up to a previous visit June 17, 2019.   He has a history of renal cell cance • OTHER SURGICAL HISTORY      Microdiscectomy L4-5; good outcome, Completed at [facility] (Disc displacement)   • RECTUM SURGERY PROCEDURE UNLISTED  1994    repair of rectal tear   • SPINE SURGERY PROCEDURE UNLISTED      lumbar microdiscectomy   • TONSILLECTOM VOMITING      ROS:       PHYSICAL EXAM:   Digital prostate exam shows normal sphincter tone, prostate approximately 50 g symmetric without masses or nodules. Phallus and testicle exams both unremarkable.   Abdomen soft nontender nondistended without eviden

## 2020-07-22 ENCOUNTER — OFFICE VISIT (OUTPATIENT)
Dept: INTERNAL MEDICINE CLINIC | Facility: CLINIC | Age: 60
End: 2020-07-22
Payer: COMMERCIAL

## 2020-07-22 VITALS
SYSTOLIC BLOOD PRESSURE: 160 MMHG | HEART RATE: 82 BPM | WEIGHT: 186 LBS | HEIGHT: 69 IN | DIASTOLIC BLOOD PRESSURE: 83 MMHG | BODY MASS INDEX: 27.55 KG/M2

## 2020-07-22 DIAGNOSIS — M19.90 OSTEOARTHRITIS, UNSPECIFIED OSTEOARTHRITIS TYPE, UNSPECIFIED SITE: ICD-10-CM

## 2020-07-22 DIAGNOSIS — I10 ESSENTIAL HYPERTENSION WITH GOAL BLOOD PRESSURE LESS THAN 140/90: Primary | ICD-10-CM

## 2020-07-22 DIAGNOSIS — R79.89 ABNORMAL TSH: ICD-10-CM

## 2020-07-22 DIAGNOSIS — Z85.528 HISTORY OF KIDNEY CANCER: ICD-10-CM

## 2020-07-22 PROCEDURE — 99214 OFFICE O/P EST MOD 30 MIN: CPT | Performed by: INTERNAL MEDICINE

## 2020-07-22 PROCEDURE — 3079F DIAST BP 80-89 MM HG: CPT | Performed by: INTERNAL MEDICINE

## 2020-07-22 PROCEDURE — 3008F BODY MASS INDEX DOCD: CPT | Performed by: INTERNAL MEDICINE

## 2020-07-22 PROCEDURE — 3077F SYST BP >= 140 MM HG: CPT | Performed by: INTERNAL MEDICINE

## 2020-07-22 NOTE — PROGRESS NOTES
HPI:    Patient ID: Rajendra Laguna is a 61year old male. HPI  Patient is here for follow-up on chronic medical issues as listed below.   Last seen here in late October of last year for general physical exam.  Full blood work showed mildly suppressed TSH outcome, Completed at [facility]   • High blood pressure    • High cholesterol    • Hypertension    • Knee injuries     bilateral, arthroscopy, detached patella Rt /Lt Football injury   • Leg cramps 2013    Severe leg cramps with lisinopril-HCTZ   • Osteoarthrit Musculoskeletal: Negative for joint pain. Skin: Negative for rash. Neurological: Negative for weakness, numbness and headaches. Hematological: Does not bruise/bleed easily. Psychiatric/Behavioral: Negative for depressed mood.  The patient is not n Edema not present. Pulmonary/Chest: Effort normal and breath sounds normal. He has no wheezes. He has no rales. Abdominal: Soft. Bowel sounds are normal. He exhibits no mass. There is no tenderness. Musculoskeletal: He exhibits no tenderness.    Melburn Gentleman requested or ordered in this encounter       Imaging & Referrals:  None         Teresa Bhatti MD

## 2020-07-28 LAB
T3, FREE: 2.9 PG/ML (ref 2.3–4.2)
T4, FREE: 1.2 NG/DL (ref 0.8–1.8)
TSH: 0.45 MIU/L (ref 0.4–4.5)

## 2020-08-04 RX ORDER — TAMSULOSIN HYDROCHLORIDE 0.4 MG/1
CAPSULE ORAL
Qty: 90 CAPSULE | Refills: 1 | Status: SHIPPED | OUTPATIENT
Start: 2020-08-04 | End: 2021-02-01

## 2020-08-05 RX ORDER — LOSARTAN POTASSIUM 100 MG/1
100 TABLET ORAL
Qty: 90 TABLET | Refills: 1 | Status: SHIPPED | OUTPATIENT
Start: 2020-08-05 | End: 2020-10-30 | Stop reason: ALTCHOICE

## 2020-08-05 RX ORDER — TAMSULOSIN HYDROCHLORIDE 0.4 MG/1
0.4 CAPSULE ORAL DAILY
Qty: 90 CAPSULE | Refills: 1 | Status: SHIPPED | OUTPATIENT
Start: 2020-08-05 | End: 2020-10-30

## 2020-09-01 ENCOUNTER — TELEPHONE (OUTPATIENT)
Dept: INTERNAL MEDICINE CLINIC | Facility: CLINIC | Age: 60
End: 2020-09-01

## 2020-09-01 NOTE — TELEPHONE ENCOUNTER
Patient dropped off short term disability paperwork, Signed HIPPA and paid $25 fee. Scanned to [employer].

## 2020-09-03 ENCOUNTER — TELEPHONE (OUTPATIENT)
Dept: INTERNAL MEDICINE CLINIC | Facility: CLINIC | Age: 60
End: 2020-09-03

## 2020-09-03 DIAGNOSIS — Z00.00 ROUTINE PHYSICAL EXAMINATION: Primary | ICD-10-CM

## 2020-09-03 NOTE — TELEPHONE ENCOUNTER
Pt was called and notified that fasting lab orders were generated and to be completed before 10/21/20. Pt verbalized understanding. Copy of lab orders m/o to pt as well.

## 2020-09-03 NOTE — TELEPHONE ENCOUNTER
Dr. Rocío Boudreaux, patient is schedule for a Physical on 10/30/2020. Patient is requesting blood test order for appointment. Please advise.

## 2020-09-10 NOTE — TELEPHONE ENCOUNTER
Dr. Rogerio Elam,       **2 Forms**      Please sign off on form: Fmla and Disab  -Highlight the patient and hit \"Chart\" button.   -In Chart Review, w/in the Encounter tab - click 1 time on the Telephone call encounter for 9/1/2020 Scroll down the telephone en

## 2020-09-17 NOTE — TELEPHONE ENCOUNTER
Dr. Oquendo,     **2nd Attempt**      **2 Forms**        Please sign off on form: Fmla and Disab  -Highlight the patient and hit \"Chart\" button.   -In Chart Review, w/in the Encounter tab - click 1 time on the Telephone call encounter for 9/1/2020 [facility]

## 2020-09-22 NOTE — TELEPHONE ENCOUNTER
Dr. Oquendo,     **3rd Attempt**        **2 Forms**        Please sign off on form: Fmla and Disab  -Highlight the patient and hit \"Chart\" button.   -In Chart Review, w/in the Encounter tab - click 1 time on the Telephone call encounter for 9/1/2020 [facility]

## 2020-09-24 NOTE — TELEPHONE ENCOUNTER
Form signed. Returned to [facility].  Please contact the forms people

## 2020-09-24 NOTE — TELEPHONE ENCOUNTER
Forms completed and faxed to [address] [place] [address] at [phone]. Sent pt [employer].

## 2020-10-11 RX ORDER — AMLODIPINE BESYLATE 10 MG/1
TABLET ORAL
Qty: 90 TABLET | Refills: 1 | Status: SHIPPED | OUTPATIENT
Start: 2020-10-11 | End: 2020-10-30

## 2020-10-19 RX ORDER — METOPROLOL SUCCINATE 50 MG/1
TABLET, EXTENDED RELEASE ORAL
Qty: 90 TABLET | Refills: 3 | Status: SHIPPED | OUTPATIENT
Start: 2020-10-19 | End: 2021-08-04

## 2020-10-19 RX ORDER — METOPROLOL SUCCINATE 100 MG/1
TABLET, EXTENDED RELEASE ORAL
Qty: 90 TABLET | Refills: 3 | Status: SHIPPED | OUTPATIENT
Start: 2020-10-19 | End: 2021-08-04

## 2020-10-30 ENCOUNTER — OFFICE VISIT (OUTPATIENT)
Dept: INTERNAL MEDICINE CLINIC | Facility: CLINIC | Age: 60
End: 2020-10-30
Payer: COMMERCIAL

## 2020-10-30 VITALS
WEIGHT: 194.88 LBS | BODY MASS INDEX: 28.87 KG/M2 | HEIGHT: 69 IN | SYSTOLIC BLOOD PRESSURE: 152 MMHG | HEART RATE: 71 BPM | DIASTOLIC BLOOD PRESSURE: 86 MMHG

## 2020-10-30 DIAGNOSIS — N40.0 BENIGN PROSTATIC HYPERPLASIA, UNSPECIFIED WHETHER LOWER URINARY TRACT SYMPTOMS PRESENT: ICD-10-CM

## 2020-10-30 DIAGNOSIS — Z85.528 HISTORY OF KIDNEY CANCER: ICD-10-CM

## 2020-10-30 DIAGNOSIS — Z00.00 ROUTINE PHYSICAL EXAMINATION: Primary | ICD-10-CM

## 2020-10-30 DIAGNOSIS — I10 ESSENTIAL HYPERTENSION WITH GOAL BLOOD PRESSURE LESS THAN 140/90: ICD-10-CM

## 2020-10-30 PROCEDURE — 3008F BODY MASS INDEX DOCD: CPT | Performed by: INTERNAL MEDICINE

## 2020-10-30 PROCEDURE — 3079F DIAST BP 80-89 MM HG: CPT | Performed by: INTERNAL MEDICINE

## 2020-10-30 PROCEDURE — 99396 PREV VISIT EST AGE 40-64: CPT | Performed by: INTERNAL MEDICINE

## 2020-10-30 PROCEDURE — 3077F SYST BP >= 140 MM HG: CPT | Performed by: INTERNAL MEDICINE

## 2020-10-30 RX ORDER — OLMESARTAN MEDOXOMIL 40 MG/1
40 TABLET ORAL DAILY
Qty: 90 TABLET | Refills: 1 | Status: SHIPPED | OUTPATIENT
Start: 2020-10-30 | End: 2021-04-18

## 2020-10-30 NOTE — PROGRESS NOTES
HPI:    Patient ID: Ishaan Maurer is a 61year old male. HPI  Patient is here for sickle exam and follow-up on chronic medical issues as listed below. Last seen here on July 22. Blood pressure elevated at that time. We did not make any changes.   Barbara Visual impairment     reading glasses      Past Surgical History:   Procedure Laterality Date   • COLONOSCOPY     • KNEE ARTHROSCOPY Bilateral     knee injuries   • KNEE ARTHROSCOPY Right 3/20/2019    Performed by Leonidas Cooper MD at [address] [place] [address]   • Tab Take 81 mg by mouth daily. • omega-3 fatty acids 1000 MG Oral Cap Take 1,000 mg by mouth daily. • Multiple Vitamins-Minerals (MULTIVITAMIN ADULT OR) Take by mouth. Allergies:  Lisinopril-Hydrochl*        Comment:Other reaction(s):  Other kg)  07/22/20 : 186 lb (84.4 kg)  07/13/20 : 190 lb (86.2 kg)  10/28/19 : 188 lb (85.3 kg)  06/17/19 : 191 lb (86.6 kg)  04/22/19 : 191 lb 9.6 oz (86.9 kg)            ASSESSMENT/PLAN:   1. Routine physical examination  Patient    2.  Essential hypertension

## 2020-10-31 RX ORDER — AMLODIPINE BESYLATE 10 MG/1
10 TABLET ORAL DAILY
Qty: 90 TABLET | Refills: 1 | Status: SHIPPED | OUTPATIENT
Start: 2020-10-31 | End: 2021-07-16

## 2020-10-31 RX ORDER — TAMSULOSIN HYDROCHLORIDE 0.4 MG/1
0.4 CAPSULE ORAL DAILY
Qty: 90 CAPSULE | Refills: 1 | Status: SHIPPED | OUTPATIENT
Start: 2020-10-31 | End: 2021-10-06

## 2021-02-01 ENCOUNTER — OFFICE VISIT (OUTPATIENT)
Dept: INTERNAL MEDICINE CLINIC | Facility: CLINIC | Age: 61
End: 2021-02-01
Payer: COMMERCIAL

## 2021-02-01 VITALS
SYSTOLIC BLOOD PRESSURE: 128 MMHG | BODY MASS INDEX: 29.12 KG/M2 | HEART RATE: 74 BPM | WEIGHT: 196.63 LBS | DIASTOLIC BLOOD PRESSURE: 76 MMHG | HEIGHT: 69 IN

## 2021-02-01 DIAGNOSIS — Z85.528 HISTORY OF KIDNEY CANCER: ICD-10-CM

## 2021-02-01 DIAGNOSIS — I10 ESSENTIAL HYPERTENSION WITH GOAL BLOOD PRESSURE LESS THAN 140/90: Primary | ICD-10-CM

## 2021-02-01 DIAGNOSIS — N40.0 BENIGN PROSTATIC HYPERPLASIA, UNSPECIFIED WHETHER LOWER URINARY TRACT SYMPTOMS PRESENT: ICD-10-CM

## 2021-02-01 PROCEDURE — 3074F SYST BP LT 130 MM HG: CPT | Performed by: INTERNAL MEDICINE

## 2021-02-01 PROCEDURE — 3008F BODY MASS INDEX DOCD: CPT | Performed by: INTERNAL MEDICINE

## 2021-02-01 PROCEDURE — 99213 OFFICE O/P EST LOW 20 MIN: CPT | Performed by: INTERNAL MEDICINE

## 2021-02-01 PROCEDURE — 3078F DIAST BP <80 MM HG: CPT | Performed by: INTERNAL MEDICINE

## 2021-02-01 NOTE — PROGRESS NOTES
HPI:    Patient ID: Bienvenido Cho is a 61year old male. HPI  Patient is here for follow-up on chronic medical issues as listed below. Last seen in the office on October 30 for physical exam.  At that time we switch losartan 100 over to olmesartan 40. lisinopril-HCTZ   • Osteoarthritis    • Renal cell carcinoma [facility]) April 23, 2015    Right kidney removed   • Tonsillitis 1993    tonsillectomy   • Visual impairment     reading glasses      Past Surgical History:   Procedure Laterality Date   • COLONOSCOP 90 tablet 3   • Ergocalciferol (VITAMIN D OR) Take by mouth. • aspirin 81 MG Oral Tab Take 81 mg by mouth daily. • Multiple Vitamins-Minerals (MULTIVITAMIN ADULT OR) Take by mouth.        Allergies:  Lisinopril-Hydrochl*        Comment:Other reactio the blood pressure control. It seems like his blood pressure is higher earlier in the morning. Follow-up in 6 months. 2. Benign prostatic hyperplasia, unspecified whether lower urinary tract symptoms present  Stable. Continue with Flomax.     3. Histo

## 2021-04-18 RX ORDER — OLMESARTAN MEDOXOMIL 40 MG/1
TABLET ORAL
Qty: 90 TABLET | Refills: 1 | Status: SHIPPED | OUTPATIENT
Start: 2021-04-18 | End: 2021-10-06

## 2021-05-09 ENCOUNTER — PATIENT MESSAGE (OUTPATIENT)
Dept: INTERNAL MEDICINE CLINIC | Facility: CLINIC | Age: 61
End: 2021-05-09

## 2021-05-09 NOTE — TELEPHONE ENCOUNTER
Able to reconcile COVID vaccine info from [facility] interface. [facility] COVID vaccine order removed from chart.

## 2021-05-09 NOTE — TELEPHONE ENCOUNTER
From: Nola Betancourt  To: Arlet Weinstein MD  Sent: 5/9/2021 12:24 PM CDT  Subject: Other    I want to notify the  that I am fully vaccinated, Shon Gómez, dates: first- 3/26/2021, second 4/23/2021 at [facility].

## 2021-07-09 ENCOUNTER — HOSPITAL ENCOUNTER (OUTPATIENT)
Dept: GENERAL RADIOLOGY | Facility: HOSPITAL | Age: 61
Discharge: HOME | End: 2021-07-09
Attending: UROLOGY
Payer: COMMERCIAL

## 2021-07-09 DIAGNOSIS — C64.1 MALIGNANT NEOPLASM OF RIGHT KIDNEY (HCC): ICD-10-CM

## 2021-07-09 PROCEDURE — 71046 X-RAY EXAM CHEST 2 VIEWS: CPT | Performed by: UROLOGY

## 2021-07-16 RX ORDER — AMLODIPINE BESYLATE 10 MG/1
TABLET ORAL
Qty: 90 TABLET | Refills: 1 | Status: SHIPPED | OUTPATIENT
Start: 2021-07-16 | End: 2021-10-25

## 2021-07-30 ENCOUNTER — LAB ENCOUNTER (OUTPATIENT)
Dept: LAB | Facility: HOSPITAL | Age: 61
End: 2021-07-30
Attending: UROLOGY
Payer: COMMERCIAL

## 2021-07-30 ENCOUNTER — OFFICE VISIT (OUTPATIENT)
Dept: SURGERY | Facility: CLINIC | Age: 61
End: 2021-07-30
Payer: COMMERCIAL

## 2021-07-30 VITALS — SYSTOLIC BLOOD PRESSURE: 133 MMHG | DIASTOLIC BLOOD PRESSURE: 89 MMHG | HEART RATE: 79 BPM

## 2021-07-30 DIAGNOSIS — N40.1 BENIGN PROSTATIC HYPERPLASIA WITH LOWER URINARY TRACT SYMPTOMS, SYMPTOM DETAILS UNSPECIFIED: ICD-10-CM

## 2021-07-30 DIAGNOSIS — C64.1 MALIGNANT NEOPLASM OF RIGHT KIDNEY (HCC): Primary | ICD-10-CM

## 2021-07-30 LAB
BILIRUB UR QL: NEGATIVE
CLARITY UR: CLEAR
COLOR UR: YELLOW
GLUCOSE UR-MCNC: NEGATIVE MG/DL
KETONES UR-MCNC: NEGATIVE MG/DL
LEUKOCYTE ESTERASE UR QL STRIP.AUTO: NEGATIVE
NITRITE UR QL STRIP.AUTO: NEGATIVE
PH UR: 6 [PH] (ref 5–8)
PROT UR-MCNC: NEGATIVE MG/DL
SP GR UR STRIP: 1.01 (ref 1–1.03)
UROBILINOGEN UR STRIP-ACNC: <2

## 2021-07-30 PROCEDURE — 3075F SYST BP GE 130 - 139MM HG: CPT | Performed by: UROLOGY

## 2021-07-30 PROCEDURE — 99214 OFFICE O/P EST MOD 30 MIN: CPT | Performed by: UROLOGY

## 2021-07-30 PROCEDURE — 81001 URINALYSIS AUTO W/SCOPE: CPT | Performed by: UROLOGY

## 2021-07-30 PROCEDURE — 3079F DIAST BP 80-89 MM HG: CPT | Performed by: UROLOGY

## 2021-07-30 NOTE — PROGRESS NOTES
Clark Larsen is a 64year old male.     HPI:   Patient presents with:  Kidney Problem: Annual follow up right neprectomy      26-year-old male with a history of pathologic stage T1 a papillary renal cell cancer status post robotic assisted laparoscopic nep tonstillitis 1993   • UPPER GI ENDOSCOPY,EXAM        Family History   Problem Relation Age of Onset   • Cancer Father         salivary and kidney   • Hypertension Mother    • Arthritis Mother         gout      Social History: Social History    Tobacco Use hyperplasia with lower urinary tract symptoms, symptom details unspecified    Recommend:  –Follow-up in 1 year. –Urinalysis with microscopy today. –Call if he has any problems or concerns.     I spent a total of 25 minutes with patient more than half the

## 2021-08-04 RX ORDER — METOPROLOL SUCCINATE 100 MG/1
100 TABLET, EXTENDED RELEASE ORAL DAILY
Qty: 90 TABLET | Refills: 1 | Status: SHIPPED | OUTPATIENT
Start: 2021-08-04

## 2021-08-04 RX ORDER — METOPROLOL SUCCINATE 50 MG/1
50 TABLET, EXTENDED RELEASE ORAL DAILY
Qty: 90 TABLET | Refills: 1 | Status: SHIPPED | OUTPATIENT
Start: 2021-08-04 | End: 2022-02-03

## 2021-08-04 NOTE — TELEPHONE ENCOUNTER
Refill passed per [facility] protocol. Requested Prescriptions   Pending Prescriptions Disp Refills    metoprolol succinate 50 MG Oral Tablet 24 Hr 90 tablet 3     Sig: Take 1 tablet (50 mg total) by mouth daily.         Hypertensive Medications

## 2021-10-06 RX ORDER — OLMESARTAN MEDOXOMIL 40 MG/1
40 TABLET ORAL DAILY
Qty: 90 TABLET | Refills: 1 | Status: SHIPPED | OUTPATIENT
Start: 2021-10-06 | End: 2022-03-27

## 2021-10-06 NOTE — TELEPHONE ENCOUNTER
Please review refill protocol failed/ no protocol  Requested Prescriptions   Pending Prescriptions Disp Refills    tamsulosin (FLOMAX) cap [Pharmacy Med Name: TAMSULOSIN HCL 0.4 MG CAPSULE] 90 capsule 1     Sig: TAKE 1 CAPSULE BY MOUTH EVERY DAY        The

## 2021-10-06 NOTE — TELEPHONE ENCOUNTER
Refill passed per [facility] protocol.       Requested Prescriptions   Pending Prescriptions Disp Refills    OLMESARTAN MEDOXOMIL 40 MG Oral Tab [Pharmacy Med Name: OLMESARTAN MEDOXOMIL 40 MG TAB] 90 tablet 1     Sig: TAKE 1 TABLET BY MOUTH EVERY DAY        Hypertensive Medications Protocol Passed - 10/6/2021 12:14 AM        Passed - CMP or BMP in past 12 months        Passed - Appointment in past 6 or next 3 months        Passed - GFR  > 50     Lab Results   Component Value Date    GFRAA 61 10/22/2020                        Future Appointments         Provider Department Appt Notes    In 1 week Mac Guzman MD [place], [place], Jeanette keith msg sent to r/s when due - sm  Please order labs at [address], BP follow up            Recent Outpatient Visits              2 months ago Malignant neoplasm of right kidney [facility])    [facility], [place], Maury Anderson MD    Office Visit    8 months ago Essential hypertension with goal blood pressure less than 140/90    [place], [place], Mitchell Hughes MD    Office Visit    11 months ago Routine physical examination    [place], [place], Raymundo Ham MD    Office Visit    1 year ago Essential hypertension with goal blood pressure less than 140/90    Aleksandra Boland MD    Office Visit    1 year ago Malignant neoplasm of right kidney [facility])    [facility] [address], Maury Anderson MD    Office Visit

## 2021-10-07 RX ORDER — TAMSULOSIN HYDROCHLORIDE 0.4 MG/1
0.4 CAPSULE ORAL DAILY
Qty: 90 CAPSULE | Refills: 1 | Status: SHIPPED | OUTPATIENT
Start: 2021-10-07

## 2021-10-15 ENCOUNTER — OFFICE VISIT (OUTPATIENT)
Dept: INTERNAL MEDICINE CLINIC | Facility: CLINIC | Age: 61
End: 2021-10-15
Payer: COMMERCIAL

## 2021-10-15 VITALS
DIASTOLIC BLOOD PRESSURE: 78 MMHG | RESPIRATION RATE: 18 BRPM | HEIGHT: 69 IN | HEART RATE: 68 BPM | WEIGHT: 188 LBS | BODY MASS INDEX: 27.85 KG/M2 | TEMPERATURE: 98 F | SYSTOLIC BLOOD PRESSURE: 152 MMHG

## 2021-10-15 DIAGNOSIS — I10 ESSENTIAL HYPERTENSION WITH GOAL BLOOD PRESSURE LESS THAN 140/90: ICD-10-CM

## 2021-10-15 DIAGNOSIS — N18.31 STAGE 3A CHRONIC KIDNEY DISEASE (HCC): ICD-10-CM

## 2021-10-15 DIAGNOSIS — Z85.528 HISTORY OF KIDNEY CANCER: ICD-10-CM

## 2021-10-15 DIAGNOSIS — Z00.00 ROUTINE PHYSICAL EXAMINATION: Primary | ICD-10-CM

## 2021-10-15 DIAGNOSIS — N40.0 BENIGN PROSTATIC HYPERPLASIA, UNSPECIFIED WHETHER LOWER URINARY TRACT SYMPTOMS PRESENT: ICD-10-CM

## 2021-10-15 DIAGNOSIS — Z23 NEED FOR VACCINATION: ICD-10-CM

## 2021-10-15 PROCEDURE — 3077F SYST BP >= 140 MM HG: CPT | Performed by: INTERNAL MEDICINE

## 2021-10-15 PROCEDURE — 90472 IMMUNIZATION ADMIN EACH ADD: CPT | Performed by: INTERNAL MEDICINE

## 2021-10-15 PROCEDURE — 90686 IIV4 VACC NO PRSV 0.5 ML IM: CPT | Performed by: INTERNAL MEDICINE

## 2021-10-15 PROCEDURE — 90750 HZV VACC RECOMBINANT IM: CPT | Performed by: INTERNAL MEDICINE

## 2021-10-15 PROCEDURE — 90471 IMMUNIZATION ADMIN: CPT | Performed by: INTERNAL MEDICINE

## 2021-10-15 PROCEDURE — 99396 PREV VISIT EST AGE 40-64: CPT | Performed by: INTERNAL MEDICINE

## 2021-10-15 PROCEDURE — 3078F DIAST BP <80 MM HG: CPT | Performed by: INTERNAL MEDICINE

## 2021-10-15 PROCEDURE — 3008F BODY MASS INDEX DOCD: CPT | Performed by: INTERNAL MEDICINE

## 2021-10-15 NOTE — PROGRESS NOTES
HPI:    Patient ID: Yobani Patterson is a 64year old male. HPI  Patient is here requesting a physical exam and follow-up on chronic medical issues as listed below. Last seen in the office on February 1.   At that time no changes were made except change of Completed at [address] [place] [address]   • High blood pressure    • High cholesterol    • Hypertension    • Knee injuries     bilateral, arthroscopy, detached patella Rt /Lt Football injury   • Leg cramps 2013    Severe leg cramps with lisinopril-HCTZ   • Osteoarthritis    • R BESYLATE 10 MG Oral Tab TAKE 1 TABLET BY MOUTH EVERY DAY 90 tablet 1   • Ergocalciferol (VITAMIN D OR) Take by mouth. • aspirin 81 MG Oral Tab Take 81 mg by mouth daily. • Multiple Vitamins-Minerals (MULTIVITAMIN ADULT OR) Take by mouth.        Erick Mcfarlane Mental Status: He is alert. Cranial Nerves: No cranial nerve deficit. Coordination: Coordination normal.   Psychiatric:         Mood and Affect: Mood normal.         Behavior: Behavior normal.         Thought Content:  Thought content normal. time.    Meds This Visit:  Requested Prescriptions      No prescriptions requested or ordered in this encounter       Imaging & Referrals:  Ceci Cat 0.5 ML         Miguel Muñoz MD

## 2021-10-25 RX ORDER — AMLODIPINE BESYLATE 10 MG/1
10 TABLET ORAL DAILY
Qty: 90 TABLET | Refills: 1 | Status: SHIPPED | OUTPATIENT
Start: 2021-10-25 | End: 2022-05-01

## 2021-10-25 NOTE — TELEPHONE ENCOUNTER
Refill passed per [place] [employer] [facility] protocol.   Requested Prescriptions   Pending Prescriptions Disp Refills    AMLODIPINE 10 MG Oral Tab [Pharmacy Med Name: AMLODIPINE BESYLATE 10 MG TAB] 90 tablet 1     Sig: TAKE 1 TABLET BY MOUTH EVERY DAY        Hypertensive Medications Protocol Passed - 10/25/2021 10:24 AM        Passed - CMP or BMP in past 12 months        Passed - Appointment in past 6 or next 3 months        Passed - GFR  > 50     Lab Results   Component Value Date    GFRAA 62 (L) 10/12/2021                        Recent Outpatient Visits              1 week ago Routine physical examination    Karen Portillo MD    Office Visit    2 months ago Malignant neoplasm of right kidney [facility], [address], Carmita Solo MD    Office Visit    8 months ago Essential hypertension with goal blood pressure less than 140/90    [facility], [address], Dariusz Ham MD    Office Visit    12 months ago Routine physical examination    [facility], [address], Dariusz Ham MD    Office Visit    1 year ago Essential hypertension with goal blood pressure less than 140/90    [address] [place] [address], Stephany Howard MD    Office Visit            Future Appointments         Provider Department Appt Notes    In 1 month [facility] KENNEDI  [address]

## 2021-10-28 ENCOUNTER — IMMUNIZATION (OUTPATIENT)
Dept: LAB | Facility: HOSPITAL | Age: 61
End: 2021-10-28
Attending: EMERGENCY MEDICINE
Payer: COMMERCIAL

## 2021-10-28 DIAGNOSIS — Z23 NEED FOR VACCINATION: Primary | ICD-10-CM

## 2021-10-28 PROCEDURE — 0064A SARSCOV2 VAC 50MCG/0.25ML IM: CPT

## 2021-12-17 ENCOUNTER — NURSE ONLY (OUTPATIENT)
Dept: INTERNAL MEDICINE CLINIC | Facility: CLINIC | Age: 61
End: 2021-12-17
Payer: COMMERCIAL

## 2021-12-17 DIAGNOSIS — Z23 IMMUNIZATION DUE: ICD-10-CM

## 2021-12-17 PROCEDURE — 90471 IMMUNIZATION ADMIN: CPT | Performed by: INTERNAL MEDICINE

## 2021-12-17 PROCEDURE — 90750 HZV VACC RECOMBINANT IM: CPT | Performed by: INTERNAL MEDICINE

## 2021-12-17 NOTE — PROGRESS NOTES
Patient here for scheduled nurse visit for Shingrix vaccine, ordered by Dr Nilda Jacobo. . Patient name and date of birth verified. 1 st shingrix vaccine given 10/15/21. VIS sheet given to patient.  Patient given Shingrix vaccine IM riight deltoid lot# [id number] exp

## 2022-02-03 RX ORDER — METOPROLOL SUCCINATE 50 MG/1
50 TABLET, EXTENDED RELEASE ORAL DAILY
Qty: 90 TABLET | Refills: 1 | Status: SHIPPED | OUTPATIENT
Start: 2022-02-03

## 2022-02-03 NOTE — TELEPHONE ENCOUNTER
Refill passed per [facility] [address] [facility] protocol    Requested Prescriptions   Pending Prescriptions Disp Refills    METOPROLOL SUCCINATE 50 MG Oral Tablet 24 Hr [Pharmacy Med Name: METOPROLOL SUCC ER 50 MG TAB] 90 tablet 1     Sig: TAKE 1 TABLET BY MOUTH EVERY DAY        Hypertensive Medications Protocol Passed - 2/3/2022 10:39 AM        Passed - CMP or BMP in past 12 months        Passed - Appointment in past 6 or next 3 months        Passed - GFR  > 50     Lab Results   Component Value Date    GFRAA 62 (L) 10/12/2021                           Recent Outpatient Visits              1 month ago Immunization due    [address] [facility], [address], [place]    Nurse Only    3 months ago Routine physical examination    Marchelle Lesch, Patti Kand, MD    Office Visit    6 months ago Malignant neoplasm of right kidney [facility] Jon Kumar MD    Office Visit    1 year ago Essential hypertension with goal blood pressure less than 140/90    Marchelle Lesch, [place], Abiola Fernandez MD    Office Visit    1 year ago Routine physical examination    Marchelle Lesch, Patti Kand, MD    Office Visit

## 2022-03-27 RX ORDER — OLMESARTAN MEDOXOMIL 40 MG/1
40 TABLET ORAL DAILY
Qty: 90 TABLET | Refills: 1 | Status: SHIPPED | OUTPATIENT
Start: 2022-03-27 | End: 2022-07-22

## 2022-03-27 NOTE — TELEPHONE ENCOUNTER
Refill passed per [facility] protocol.    Requested Prescriptions   Pending Prescriptions Disp Refills    OLMESARTAN MEDOXOMIL 40 MG Oral Tab [Pharmacy Med Name: OLMESARTAN MEDOXOMIL 40 MG TAB] 90 tablet 1     Sig: TAKE 1 TABLET BY MOUTH EVERY DAY        Hypertensive Medications Protocol Passed - 3/27/2022 12:15 AM        Passed - CMP or BMP in past 12 months        Passed - Appointment in past 6 or next 3 months        Passed - GFR  > 50     Lab Results   Component Value Date    GFRAA 62 (L) 10/12/2021                     Recent Outpatient Visits              3 months ago Immunization due    [facility], [address], [place]    Nurse Only    5 months ago Routine physical examination    [facility], Jessica Chaidez MD    Office Visit    8 months ago Malignant neoplasm of right kidney [facility])    [facility], Maria Fernanda Mcclure MD    Office Visit    1 year ago Essential hypertension with goal blood pressure less than 140/90    [facility], [address], Zen Ham MD    Office Visit    1 year ago Routine physical examination    [facility], [address], Jessica Chaidez MD    Office Visit          Future Appointments         Provider Department Appt Notes    In 3 weeks MD Rhett Wynn, [facility] I need a lab request sent to [facility] for this visit  Med refills

## 2022-04-18 ENCOUNTER — OFFICE VISIT (OUTPATIENT)
Dept: INTERNAL MEDICINE CLINIC | Facility: CLINIC | Age: 62
End: 2022-04-18
Payer: COMMERCIAL

## 2022-04-18 VITALS
SYSTOLIC BLOOD PRESSURE: 138 MMHG | TEMPERATURE: 98 F | HEIGHT: 69 IN | HEART RATE: 84 BPM | DIASTOLIC BLOOD PRESSURE: 78 MMHG | BODY MASS INDEX: 26.81 KG/M2 | RESPIRATION RATE: 18 BRPM | WEIGHT: 181 LBS

## 2022-04-18 DIAGNOSIS — M51.9 LUMBAR DISC DISEASE: ICD-10-CM

## 2022-04-18 DIAGNOSIS — N18.31 STAGE 3A CHRONIC KIDNEY DISEASE (HCC): ICD-10-CM

## 2022-04-18 DIAGNOSIS — N40.0 BENIGN PROSTATIC HYPERPLASIA, UNSPECIFIED WHETHER LOWER URINARY TRACT SYMPTOMS PRESENT: ICD-10-CM

## 2022-04-18 DIAGNOSIS — M79.673 PAIN OF FOOT, UNSPECIFIED LATERALITY: ICD-10-CM

## 2022-04-18 DIAGNOSIS — Z85.528 HISTORY OF KIDNEY CANCER: ICD-10-CM

## 2022-04-18 DIAGNOSIS — I10 ESSENTIAL HYPERTENSION WITH GOAL BLOOD PRESSURE LESS THAN 140/90: Primary | ICD-10-CM

## 2022-04-18 PROCEDURE — 3078F DIAST BP <80 MM HG: CPT | Performed by: INTERNAL MEDICINE

## 2022-04-18 PROCEDURE — 3075F SYST BP GE 130 - 139MM HG: CPT | Performed by: INTERNAL MEDICINE

## 2022-04-18 PROCEDURE — 3008F BODY MASS INDEX DOCD: CPT | Performed by: INTERNAL MEDICINE

## 2022-04-18 PROCEDURE — 99214 OFFICE O/P EST MOD 30 MIN: CPT | Performed by: INTERNAL MEDICINE

## 2022-04-18 RX ORDER — METOPROLOL SUCCINATE 50 MG/1
50 TABLET, EXTENDED RELEASE ORAL DAILY
Qty: 90 TABLET | Refills: 1 | Status: SHIPPED | OUTPATIENT
Start: 2022-04-18

## 2022-04-18 RX ORDER — METOPROLOL SUCCINATE 100 MG/1
100 TABLET, EXTENDED RELEASE ORAL DAILY
Qty: 90 TABLET | Refills: 1 | Status: SHIPPED | OUTPATIENT
Start: 2022-04-18

## 2022-04-20 ENCOUNTER — LAB ENCOUNTER (OUTPATIENT)
Dept: LAB | Facility: HOSPITAL | Age: 62
End: 2022-04-20
Attending: INTERNAL MEDICINE
Payer: COMMERCIAL

## 2022-04-20 PROCEDURE — 80048 BASIC METABOLIC PNL TOTAL CA: CPT | Performed by: INTERNAL MEDICINE

## 2022-04-20 PROCEDURE — 36415 COLL VENOUS BLD VENIPUNCTURE: CPT | Performed by: INTERNAL MEDICINE

## 2022-05-01 RX ORDER — AMLODIPINE BESYLATE 10 MG/1
10 TABLET ORAL DAILY
Qty: 90 TABLET | Refills: 1 | Status: SHIPPED | OUTPATIENT
Start: 2022-05-01 | End: 2022-11-13

## 2022-05-01 NOTE — TELEPHONE ENCOUNTER
Refill passed per [employer] [facility] protocol.     Requested Prescriptions   Pending Prescriptions Disp Refills    AMLODIPINE 10 MG Oral Tab [Pharmacy Med Name: AMLODIPINE BESYLATE 10 MG TAB] 90 tablet 1     Sig: TAKE 1 TABLET BY MOUTH EVERY DAY        Hypertensive Medications Protocol Passed - 5/1/2022  6:44 AM        Passed - CMP or BMP in past 12 months        Passed - Appointment in past 6 or next 3 months        Passed - GFR  > 50     Lab Results   Component Value Date    GFRAA 60 04/20/2022                       Recent Outpatient Visits              1 week ago Essential hypertension with goal blood pressure less than 140/90    [place] [employer] [facility], [address], Ester Ham MD    Office Visit    4 months ago Immunization due    [facility], [address], [place]    Nurse Only    6 months ago Routine physical examination    [facility], [address], Mary Alexander MD    Office Visit    9 months ago Malignant neoplasm of right kidney [facility], [address], Wisam Bowers MD    Office Visit    1 year ago Essential hypertension with goal blood pressure less than 140/90    [address] [facility], Ester Ham MD    Office Visit

## 2022-05-02 RX ORDER — TAMSULOSIN HYDROCHLORIDE 0.4 MG/1
CAPSULE ORAL
Qty: 90 CAPSULE | Refills: 1 | Status: SHIPPED | OUTPATIENT
Start: 2022-05-02

## 2022-07-15 ENCOUNTER — IMMUNIZATION (OUTPATIENT)
Dept: LAB | Age: 62
End: 2022-07-15
Attending: EMERGENCY MEDICINE
Payer: COMMERCIAL

## 2022-07-15 DIAGNOSIS — Z23 NEED FOR VACCINATION: Primary | ICD-10-CM

## 2022-07-15 PROCEDURE — 0094A SARSCOV2 VAC 50MCG/0.5ML IM: CPT

## 2022-07-22 RX ORDER — OLMESARTAN MEDOXOMIL 40 MG/1
TABLET ORAL
Qty: 90 TABLET | Refills: 1 | Status: SHIPPED | OUTPATIENT
Start: 2022-07-22

## 2022-08-29 ENCOUNTER — LAB ENCOUNTER (OUTPATIENT)
Dept: LAB | Facility: HOSPITAL | Age: 62
End: 2022-08-29
Attending: UROLOGY
Payer: COMMERCIAL

## 2022-08-29 ENCOUNTER — OFFICE VISIT (OUTPATIENT)
Dept: SURGERY | Facility: CLINIC | Age: 62
End: 2022-08-29
Payer: COMMERCIAL

## 2022-08-29 DIAGNOSIS — N40.1 BPH WITH OBSTRUCTION/LOWER URINARY TRACT SYMPTOMS: Primary | ICD-10-CM

## 2022-08-29 DIAGNOSIS — C64.1 MALIGNANT NEOPLASM OF RIGHT KIDNEY (HCC): ICD-10-CM

## 2022-08-29 DIAGNOSIS — N13.8 BPH WITH OBSTRUCTION/LOWER URINARY TRACT SYMPTOMS: Primary | ICD-10-CM

## 2022-08-29 LAB
BILIRUB UR QL: NEGATIVE
CLARITY UR: CLEAR
COLOR UR: YELLOW
GLUCOSE UR-MCNC: NEGATIVE MG/DL
KETONES UR-MCNC: NEGATIVE MG/DL
LEUKOCYTE ESTERASE UR QL STRIP.AUTO: NEGATIVE
NITRITE UR QL STRIP.AUTO: NEGATIVE
PH UR: 6 [PH] (ref 5–8)
PROT UR-MCNC: NEGATIVE MG/DL
SP GR UR STRIP: 1.01 (ref 1–1.03)
UROBILINOGEN UR STRIP-ACNC: 0.2

## 2022-08-29 PROCEDURE — 81001 URINALYSIS AUTO W/SCOPE: CPT | Performed by: UROLOGY

## 2022-08-29 PROCEDURE — 99214 OFFICE O/P EST MOD 30 MIN: CPT | Performed by: UROLOGY

## 2022-08-29 PROCEDURE — 81015 MICROSCOPIC EXAM OF URINE: CPT | Performed by: UROLOGY

## 2022-10-15 ENCOUNTER — IMMUNIZATION (OUTPATIENT)
Dept: LAB | Age: 62
End: 2022-10-15
Attending: EMERGENCY MEDICINE
Payer: COMMERCIAL

## 2022-10-15 DIAGNOSIS — Z23 NEED FOR VACCINATION: Primary | ICD-10-CM

## 2022-10-15 PROCEDURE — 0134A SARSCOV2 VAC BVL 50MCG/0.5ML: CPT

## 2022-10-22 NOTE — TELEPHONE ENCOUNTER
Protocol failed or has No Protocol, please review  Requested Prescriptions   Pending Prescriptions Disp Refills    METOPROLOL SUCCINATE  MG Oral Tablet 24 Hr [Pharmacy Med Name: METOPROLOL SUCC  MG TAB] 90 tablet 1     Sig: TAKE 1 TABLET BY MOUTH EVERY DAY        Hypertensive Medications Protocol Failed - 10/22/2022 12:15 AM        Failed - CMP or BMP in past 6 months     No results found for this or any previous visit (from the past 4392 hour(s)).               Failed - In person appointment or virtual visit in the past 6 months       Recent Outpatient Visits              1 month ago BPH with obstruction/lower urinary tract symptoms    [facility], [address], Eduardo Dover MD    Office Visit    6 months ago Essential hypertension with goal blood pressure less than 140/90    [address], [address], Nathan Arboleda MD    Office Visit    10 months ago Immunization due    [address] [place], [address], [facility]    Nurse Only    1 year ago Routine physical examination    [address], [address], Nathan Arboleda MD    Office Visit    1 year ago Malignant neoplasm of right kidney [facility])    [facility], [address], Eduardo Dover MD    Office Visit                 Passed - In person appointment in the past 12 or next 3 months       Recent Outpatient Visits              1 month ago BPH with obstruction/lower urinary tract symptoms    [facility], [address], Eduardo Dover MD    Office Visit    6 months ago Essential hypertension with goal blood pressure less than 140/90    Mariaelena Gallegos MD    Office Visit    10 months ago Immunization due    [address] [place], [address], [facility]    Nurse Only    1 year ago Routine physical examination    [address] [place], [address], Nathan Arboleda MD    Office Visit    1 year ago Malignant neoplasm of right kidney [facility])    [facility], [place], Jeison Weller    Office Visit                 Passed - Last BP reading less than 140/90     BP Readings from Last 1 Encounters:  04/18/22 : 138/78                Passed - EGFRCR or GFRAA > 50     GFR Evaluation  GFRAA: 60 , resulted on 4/20/2022                  Recent Outpatient Visits              1 month ago BPH with obstruction/lower urinary tract symptoms    [facility], [place], Sil Jones MD    Office Visit    6 months ago Essential hypertension with goal blood pressure less than 140/90    [place], [place], Malathi Ham MD    Office Visit    10 months ago Immunization due    [place], [place], [facility]    Nurse Only    1 year ago Routine physical examination    [place], Danyelle Marshall MD    Office Visit    1 year ago Malignant neoplasm of right kidney [facility])    [facility] [address], Sil Jones MD    Office Visit

## 2022-10-23 RX ORDER — METOPROLOL SUCCINATE 100 MG/1
100 TABLET, EXTENDED RELEASE ORAL DAILY
Qty: 90 TABLET | Refills: 1 | Status: SHIPPED | OUTPATIENT
Start: 2022-10-23

## 2022-10-27 RX ORDER — METOPROLOL SUCCINATE 50 MG/1
50 TABLET, EXTENDED RELEASE ORAL DAILY
Qty: 90 TABLET | Refills: 1 | Status: SHIPPED | OUTPATIENT
Start: 2022-10-27

## 2022-10-27 RX ORDER — METOPROLOL SUCCINATE 100 MG/1
100 TABLET, EXTENDED RELEASE ORAL DAILY
Qty: 90 TABLET | Refills: 1 | OUTPATIENT
Start: 2022-10-27

## 2022-10-27 NOTE — TELEPHONE ENCOUNTER
Please review. Protocol failed / No Protocol. FYI: This is for the 50 mg patient is on 150 mg daily    Requested Prescriptions   Pending Prescriptions Disp Refills    metoprolol succinate ER 50 MG Oral Tablet 24 Hr 90 tablet 1     Sig: Take 1 tablet (50 mg total) by mouth in the morning. Hypertensive Medications Protocol Failed - 10/26/2022  7:15 PM        Failed - CMP or BMP in past 6 months     No results found for this or any previous visit (from the past 4392 hour(s)).             Failed - In person appointment or virtual visit in the past 6 months     Recent Outpatient Visits              1 month ago BPH with obstruction/lower urinary tract symptoms    [facility], [address], Tawny Slade MD    Office Visit    6 months ago Essential hypertension with goal blood pressure less than 140/90    [employer], [address], Keke Feliciano MD    Office Visit    10 months ago Immunization due    [employer], [address], [employer]    Nurse Only    1 year ago Routine physical examination    [employer], [address], Keke Feliciano MD    Office Visit    1 year ago Malignant neoplasm of right kidney [facility])    [facility], [address], Tawny Slade MD    Office Visit          Future Appointments         Provider Department Appt Notes    In 2 months Dixon Valencia MD [employer], [facility] Over all health               Passed - In person appointment in the past 12 or next 3 months     Recent Outpatient Visits              1 month ago BPH with obstruction/lower urinary tract symptoms    [facility], [address], Tawny Slade MD    Office Visit    6 months ago Essential hypertension with goal blood pressure less than 140/90    Dashawn Seymour MD    Office Visit    10 months ago Immunization due    [employer], [address], [place]    Nurse Only    1 year ago Routine physical examination    [address], [address], Verna Ham MD    Office Visit    1 year ago Malignant neoplasm of right kidney [facility])    [facility], [address], Jessika Panda MD    Office Visit          Future Appointments         Provider Department Appt Notes    In 2 months Noni Mendoza MD [address], [facility] [place] [place] [facility]               Passed - Last BP reading less than 140/90     BP Readings from Last 1 Encounters:  04/18/22 : 138/78              Passed - EGFRCR or GFRAA > 50     GFR Evaluation  GFRAA: 60 , resulted on 4/20/2022            metoprolol succinate  MG Oral Tablet 24 Hr 90 tablet 1     Sig: Take 1 tablet (100 mg total) by mouth in the morning. Hypertensive Medications Protocol Failed - 10/26/2022  7:15 PM        Failed - CMP or BMP in past 6 months     No results found for this or any previous visit (from the past 4392 hour(s)).             Failed - In person appointment or virtual visit in the past 6 months     Recent Outpatient Visits              1 month ago BPH with obstruction/lower urinary tract symptoms    [facility], [address], Jessika Panda MD    Office Visit    6 months ago Essential hypertension with goal blood pressure less than 140/90    [address], [address], Verna Ham MD    Office Visit    10 months ago Immunization due    [address], [address], [place]    Nurse Only    1 year ago Routine physical examination    [address], [address], Yulia Jessica MD    Office Visit    1 year ago Malignant neoplasm of right kidney [facility])    [facility], [address], Jessika Panda MD    Office Visit          Future Appointments         Provider Department Appt Notes    In 2 months Noni Mendoza MD [address] [facility]               Passed - In person appointment in the past 12 or next 3 months     Recent Outpatient Visits              1 month ago BPH with obstruction/lower urinary tract symptoms    [facility], [address], Anil Conrad MD    Office Visit    6 months ago Essential hypertension with goal blood pressure less than 140/90    [address], [address], Cassidy Martin MD    Office Visit    10 months ago Immunization due    [address] [place], [address], [employer]    Nurse Only    1 year ago Routine physical examination    [address] [place], [address], Cassidy Martin MD    Office Visit    1 year ago Malignant neoplasm of right kidney [facility])    [facility], [address], Janel Echeverria MD    Office Visit          Future Appointments         Provider Department Appt Notes    In 2 months Peggy Walden MD [address] [place] Over all health               Passed - Last BP reading less than 140/90     BP Readings from Last 1 Encounters:  04/18/22 : 138/78              Passed - EGFRCR or GFRAA > 50     GFR Evaluation  GFRAA: 60 , resulted on 4/20/2022                Recent Outpatient Visits              1 month ago BPH with obstruction/lower urinary tract symptoms    [facility], [address], Anil Conrad MD    Office Visit    6 months ago Essential hypertension with goal blood pressure less than 140/90    [address] [place], [address], Justine Ham MD    Office Visit    10 months ago Immunization due    [address] [place], [address], [employer]    Nurse Only    1 year ago Routine physical examination    [address] [place], [address], Justine Ham MD    Office Visit    1 year ago Malignant neoplasm of right kidney [facility])    [facility] Kandy Luna, Anil Conrad MD    Office Visit              Future Appointments Provider Department Appt Notes    In 2 months Sapna Cheatham MD [place] [facility] [place], [facility], [facility] all [facility]

## 2022-10-27 NOTE — TELEPHONE ENCOUNTER
Please review. Protocol failed / No Protocol. Requested Prescriptions   Pending Prescriptions Disp Refills    metoprolol succinate ER 50 MG Oral Tablet 24 Hr 90 tablet 1     Sig: Take 1 tablet (50 mg total) by mouth in the morning. Hypertensive Medications Protocol Failed - 10/26/2022  7:15 PM        Failed - CMP or BMP in past 6 months     No results found for this or any previous visit (from the past 4392 hour(s)).             Failed - In person appointment or virtual visit in the past 6 months     Recent Outpatient Visits              1 month ago BPH with obstruction/lower urinary tract symptoms    [facility], [address], Jose Lund MD    Office Visit    6 months ago Essential hypertension with goal blood pressure less than 140/90    [place] [employer] [facility], [address], Mita Gray MD    Office Visit    10 months ago Immunization due    [place] [employer] [facility], [address], [address]    Nurse Only    1 year ago Routine physical examination    [place] [employer] [facility], [address], Mita Gray MD    Office Visit    1 year ago Malignant neoplasm of right kidney [facility])    [facility], [address], Noris Echeverria MD    Office Visit          Future Appointments         Provider Department Appt Notes    In 2 months Dilip Taylor MD [place] [employer] [place] [employer] [facility], [facility] Over all health               Passed - In person appointment in the past 12 or next 3 months     Recent Outpatient Visits              1 month ago BPH with obstruction/lower urinary tract symptoms    [facility], [address], Jose Lund MD    Office Visit    6 months ago Essential hypertension with goal blood pressure less than 140/90    Joel Reyes MD    Office Visit    10 months ago Immunization due    [employer] [facility], [address], [address]    Nurse Only    1 year ago Routine physical examination    Gerardo Canales MD    Office Visit    1 year ago Malignant neoplasm of right kidney [facility])    [facility], [place], Dorene Garza MD    Office Visit          Future Appointments         Provider Department Appt Notes    In 2 months Kenya Ornelas MD [address] [place] Over all health               Passed - Last BP reading less than 140/90     BP Readings from Last 1 Encounters:  04/18/22 : 138/78              Passed - EGFRCR or GFRAA > 50     GFR Evaluation  GFRAA: 60 , resulted on 4/20/2022            metoprolol succinate  MG Oral Tablet 24 Hr 90 tablet 1     Sig: Take 1 tablet (100 mg total) by mouth in the morning. Hypertensive Medications Protocol Failed - 10/26/2022  7:15 PM        Failed - CMP or BMP in past 6 months     No results found for this or any previous visit (from the past 4392 hour(s)).             Failed - In person appointment or virtual visit in the past 6 months     Recent Outpatient Visits              1 month ago BPH with obstruction/lower urinary tract symptoms    [facility], [place], Dorene Garza MD    Office Visit    6 months ago Essential hypertension with goal blood pressure less than 140/90    [address] [place], Deeann [place], MD    Office Visit    10 months ago Immunization due    [address] [place], [place]    Nurse Only    1 year ago Routine physical examination    [facility] [place] [address] [employer] [place], Kelvin Marrero MD    Office Visit    1 year ago Malignant neoplasm of right kidney [facility])    [facility], [place], Dorene Garza MD    Office Visit          Future Appointments         Provider Department Appt Notes    In 2 months Kenya Ornelas MD [address] Over all health               Passed - In person appointment in the past 12 or next 3 months     Recent Outpatient Visits              1 month ago BPH with obstruction/lower urinary tract symptoms    [facility] Caresse Motts, MD    Office Visit    6 months ago Essential hypertension with goal blood pressure less than 140/90    [address] Noberto Atlas, MD    Office Visit    10 months ago Immunization due    [address]    Nurse Only    1 year ago Routine physical examination    [address] Noberto Atlas, MD    Office Visit    1 year ago Malignant neoplasm of right kidney [facility])    [facility] [place], Raliegh Gums, MD    Office Visit          Future Appointments         Provider Department Appt Notes    In 2 months Annalise Cassidy MD [address] Over all health               Passed - Last BP reading less than 140/90     BP Readings from Last 1 Encounters:  04/18/22 : 138/78              Passed - EGFRCR or GFRAA > 50     GFR Evaluation  GFRAA: 60 , resulted on 4/20/2022                Recent Outpatient Visits              1 month ago BPH with obstruction/lower urinary tract symptoms    [facility] Motts, MD    Office Visit    6 months ago Essential hypertension with goal blood pressure less than 140/90    [address] [place], Gabriela Plant, MD    Office Visit    10 months ago Immunization due    [address]    Nurse Only    1 year ago Routine physical examination    [address] [place], Gabriela Plant, MD    Office Visit    1 year ago Malignant neoplasm of right kidney [facility])    [facility] Caresse Motts, MD    Office Visit              Future Appointments         Provider Department Appt Notes    In 2 months Aruna Santos MD [place] [facility] [place], [facility], [facility]

## 2022-11-13 RX ORDER — AMLODIPINE BESYLATE 10 MG/1
TABLET ORAL
Qty: 90 TABLET | Refills: 1 | Status: SHIPPED | OUTPATIENT
Start: 2022-11-13

## 2022-11-13 NOTE — TELEPHONE ENCOUNTER
Please review. Protocol Failed or has No Protocol. Requested Prescriptions   Pending Prescriptions Disp Refills    AMLODIPINE 10 MG Oral Tab [Pharmacy Med Name: AMLODIPINE BESYLATE 10 MG TAB] 90 tablet 1     Sig: TAKE 1 TABLET BY MOUTH EVERY DAY       Hypertensive Medications Protocol Failed - 11/12/2022 12:16 AM        Failed - CMP or BMP in past 6 months     No results found for this or any previous visit (from the past 4392 hour(s)).             Failed - In person appointment or virtual visit in the past 6 months     Recent Outpatient Visits              2 months ago BPH with obstruction/lower urinary tract symptoms    [facility], [address], Frances Ann MD    Office Visit    6 months ago Essential hypertension with goal blood pressure less than 140/90    [employer] [facility], [address], Val Su MD    Office Visit    11 months ago Immunization due    [place] [employer] [facility], [address], [address]    Nurse Only    1 year ago Routine physical examination    [place] [employer] [facility], [address], Val Su MD    Office Visit    1 year ago Malignant neoplasm of right kidney [facility])    [facility], [address], Suki Echeverria MD    Office Visit          Future Appointments         Provider Department Appt Notes    In 2 months Margoth Lane MD [place] [employer] [facility], [facility] Over all health               Passed - In person appointment in the past 12 or next 3 months     Recent Outpatient Visits              2 months ago BPH with obstruction/lower urinary tract symptoms    [facility], [address], Frances Ann MD    Office Visit    6 months ago Essential hypertension with goal blood pressure less than 140/90    Val Mauro MD    Office Visit    11 months ago Immunization due    [employer] [facility], [address], [address]    Nurse Only    1 year ago Routine physical examination    Yasir Sheikh MD    Office Visit    1 year ago Malignant neoplasm of right kidney [facility])    [facility], [address], [place]i [place], [place]    Office Visit          Future Appointments         Provider Department Appt Notes    In 2 months Theodor Brittle, MD [place] [employer] [facility], [address], [place] Over all health               Passed - Last BP reading less than 140/90     BP Readings from Last 1 Encounters:  04/18/22 : 138/78              Passed - EGFRCR or GFRAA > 50     GFR Evaluation  GFRAA: 60 , resulted on 4/20/2022               Future Appointments         Provider Department Appt Notes    In 2 months Theodor Brittle, MD [place] [employer] [facility], [place] Over all health            Recent Outpatient Visits              2 months ago BPH with obstruction/lower urinary tract symptoms    [facility], [address], Jelani Cohen MD    Office Visit    6 months ago Essential hypertension with goal blood pressure less than 140/90    Yasir Sheikh MD    Office Visit    11 months ago Immunization due    [employer] [place], [facility], [address], [place]    Nurse Only    1 year ago Routine physical examination    [employer] [facility], [address], Durga Smith MD    Office Visit    1 year ago Malignant neoplasm of right kidney [facility])    [facility] for Fahad Kumar, Jelani Cohen MD    Office Visit

## 2023-01-16 ENCOUNTER — LAB ENCOUNTER (OUTPATIENT)
Dept: LAB | Facility: HOSPITAL | Age: 63
End: 2023-01-16
Attending: INTERNAL MEDICINE
Payer: COMMERCIAL

## 2023-01-16 DIAGNOSIS — Z00.00 ROUTINE PHYSICAL EXAMINATION: ICD-10-CM

## 2023-01-16 LAB — COMPLEXED PSA SERPL-MCNC: 4.66 NG/ML (ref ?–4)

## 2023-01-16 PROCEDURE — 80061 LIPID PANEL: CPT | Performed by: INTERNAL MEDICINE

## 2023-01-16 PROCEDURE — 82607 VITAMIN B-12: CPT | Performed by: INTERNAL MEDICINE

## 2023-01-16 PROCEDURE — 84443 ASSAY THYROID STIM HORMONE: CPT | Performed by: INTERNAL MEDICINE

## 2023-01-16 PROCEDURE — 80053 COMPREHEN METABOLIC PANEL: CPT | Performed by: INTERNAL MEDICINE

## 2023-01-16 PROCEDURE — 85025 COMPLETE CBC W/AUTO DIFF WBC: CPT | Performed by: INTERNAL MEDICINE

## 2023-01-16 PROCEDURE — 36415 COLL VENOUS BLD VENIPUNCTURE: CPT | Performed by: INTERNAL MEDICINE

## 2023-01-20 ENCOUNTER — OFFICE VISIT (OUTPATIENT)
Dept: INTERNAL MEDICINE CLINIC | Facility: CLINIC | Age: 63
End: 2023-01-20

## 2023-01-20 VITALS
HEIGHT: 69 IN | SYSTOLIC BLOOD PRESSURE: 138 MMHG | TEMPERATURE: 98 F | BODY MASS INDEX: 27.55 KG/M2 | HEART RATE: 74 BPM | DIASTOLIC BLOOD PRESSURE: 78 MMHG | WEIGHT: 186 LBS | RESPIRATION RATE: 18 BRPM

## 2023-01-20 DIAGNOSIS — Z00.00 ROUTINE PHYSICAL EXAMINATION: Primary | ICD-10-CM

## 2023-01-20 DIAGNOSIS — N40.0 BENIGN PROSTATIC HYPERPLASIA, UNSPECIFIED WHETHER LOWER URINARY TRACT SYMPTOMS PRESENT: ICD-10-CM

## 2023-01-20 DIAGNOSIS — R97.20 ELEVATED PSA: ICD-10-CM

## 2023-01-20 DIAGNOSIS — I10 ESSENTIAL HYPERTENSION WITH GOAL BLOOD PRESSURE LESS THAN 140/90: ICD-10-CM

## 2023-01-20 DIAGNOSIS — N18.31 STAGE 3A CHRONIC KIDNEY DISEASE (HCC): ICD-10-CM

## 2023-01-20 DIAGNOSIS — M51.9 LUMBAR DISC DISEASE: ICD-10-CM

## 2023-01-20 DIAGNOSIS — Z85.528 HISTORY OF KIDNEY CANCER: ICD-10-CM

## 2023-01-20 PROCEDURE — 3075F SYST BP GE 130 - 139MM HG: CPT | Performed by: INTERNAL MEDICINE

## 2023-01-20 PROCEDURE — 3078F DIAST BP <80 MM HG: CPT | Performed by: INTERNAL MEDICINE

## 2023-01-20 PROCEDURE — 99396 PREV VISIT EST AGE 40-64: CPT | Performed by: INTERNAL MEDICINE

## 2023-01-20 PROCEDURE — 3008F BODY MASS INDEX DOCD: CPT | Performed by: INTERNAL MEDICINE

## 2023-01-20 RX ORDER — CHLORAL HYDRATE 500 MG
1000 CAPSULE ORAL DAILY
COMMUNITY

## 2023-01-20 RX ORDER — VITAMIN E 268 MG
CAPSULE ORAL
COMMUNITY

## 2023-04-14 ENCOUNTER — LAB ENCOUNTER (OUTPATIENT)
Dept: LAB | Facility: HOSPITAL | Age: 63
End: 2023-04-14
Attending: INTERNAL MEDICINE
Payer: COMMERCIAL

## 2023-04-14 DIAGNOSIS — R97.20 ELEVATED PSA: ICD-10-CM

## 2023-04-14 LAB — PSA SERPL-MCNC: 5.62 NG/ML (ref ?–4)

## 2023-04-14 PROCEDURE — 84153 ASSAY OF PSA TOTAL: CPT

## 2023-04-14 PROCEDURE — 36415 COLL VENOUS BLD VENIPUNCTURE: CPT

## 2023-04-14 RX ORDER — AMLODIPINE BESYLATE 10 MG/1
10 TABLET ORAL DAILY
Qty: 90 TABLET | Refills: 3 | Status: SHIPPED | OUTPATIENT
Start: 2023-04-14

## 2023-04-14 RX ORDER — METOPROLOL SUCCINATE 50 MG/1
50 TABLET, EXTENDED RELEASE ORAL DAILY
Qty: 90 TABLET | Refills: 3 | Status: SHIPPED | OUTPATIENT
Start: 2023-04-14

## 2023-04-14 RX ORDER — METOPROLOL SUCCINATE 100 MG/1
100 TABLET, EXTENDED RELEASE ORAL DAILY
Qty: 90 TABLET | Refills: 3 | Status: SHIPPED | OUTPATIENT
Start: 2023-04-14

## 2023-04-14 NOTE — TELEPHONE ENCOUNTER
Please review refill failed/no protocol - Allergy warning     Requested Prescriptions     Pending Prescriptions Disp Refills    tamsulosin 0.4 MG Oral Cap [Pharmacy Med Name: TAMSULOSIN HCL 0.4 MG CAPSULE] 90 capsule 3     Sig: Take 1 capsule (0.4 mg total) by mouth every morning. Signed Prescriptions Disp Refills    amLODIPine 10 MG Oral Tab 90 tablet 3     Sig: Take 1 tablet (10 mg total) by mouth daily. Authorizing Provider: Ravindra Soria     Ordering User: Leta Fernandez    metoprolol succinate ER 50 MG Oral Tablet 24 Hr 90 tablet 3     Sig: Take 1 tablet (50 mg total) by mouth daily. Authorizing Provider: Ravindra Soria     Ordering User: Dormarye Jim         Recent Visits  Date Type Provider Dept   01/20/23 Office Visit Theodor Brittle, MD [facility]-Internal Med   04/18/22 Office Visit Theodor Brittle, MD [facility]-Internal Med   Showing recent visits within past 540 days with a meds authorizing provider and meeting all other requirements  Future Appointments  Date Type Provider Dept   07/21/23 Appointment Theodor Brittle, MD [facility]-Internal Med   Showing future appointments within next 150 days with a meds authorizing provider and meeting all other requirements    Requested Prescriptions   Pending Prescriptions Disp Refills    tamsulosin 0.4 MG Oral Cap [Pharmacy Med Name: TAMSULOSIN HCL 0.4 MG CAPSULE] 90 capsule 3     Sig: Take 1 capsule (0.4 mg total) by mouth every morning.        Genitourinary Medications Passed - 4/14/2023  9:00 AM        Passed - Patient does not have pulmonary hypertension on problem list        Passed - In person appointment or virtual visit in the past 12 mos or appointment in next 3 mos     Recent Outpatient Visits              2 months ago Routine physical examination    Zoltan Calvo MD    Office Visit    7 months ago BPH with obstruction/lower urinary tract symptoms    [facility], [address] [facility],[address], Nicola Maloney MD    Office Visit    12 months ago Essential hypertension with goal blood pressure less than 140/90    [facility], [address], Maxine Ham MD    Office Visit    1 year ago Immunization due    Tereza Lees, [address], [place]    Nurse Only    1 year ago Routine physical examination    [address] [facility], Dru Biggs MD    Office Visit          Future Appointments         Provider Department Appt Notes    In 3 months Deon Snellen, MD [facility], [address] [facility] f/u                Signed Prescriptions Disp Refills    amLODIPine 10 MG Oral Tab 90 tablet 3     Sig: Take 1 tablet (10 mg total) by mouth daily.        Hypertensive Medications Protocol Passed - 4/14/2023  9:00 AM        Passed - In person appointment in the past 12 or next 3 months     Recent Outpatient Visits              2 months ago Routine physical examination    Dru Esposito MD    Office Visit    7 months ago BPH with obstruction/lower urinary tract symptoms    [facility], [address], Nicola Maloney MD    Office Visit    12 months ago Essential hypertension with goal blood pressure less than 140/90    [address] [facility], Maxine Ham MD    Office Visit    1 year ago Immunization due    Tereza Lees, [address], [place]    Nurse Only    1 year ago Routine physical examination    [address] [facility], Dru Biggs MD    Office Visit          Future Appointments         Provider Department Appt Notes    In 3 months Deon Snellen, MD Tereza Lees, [address] [facility] Jeanette Mckeon 6M f/u               Passed - Last BP reading less than 140/90     BP Readings from Last 1 Encounters:  01/20/23 : 138/78 Passed - CMP or BMP in past 6 months     Recent Results (from the past 4392 hour(s))   COMP METABOLIC PANEL (14)    Collection Time: 01/16/23  9:11 AM   Result Value Ref Range    Glucose 109 (H) 70 - 99 mg/dL    Sodium 140 136 - 145 mmol/L    Potassium 4.2 3.5 - 5.1 mmol/L    Chloride 112 98 - 112 mmol/L    CO2 27.0 21.0 - 32.0 mmol/L    Anion Gap 1 0 - 18 mmol/L    BUN 13 7 - 18 mg/dL    Creatinine 1.40 (H) 0.70 - 1.30 mg/dL    BUN/CREA Ratio 9.3 (L) 10.0 - 20.0    Calcium, Total 9.3 8.5 - 10.1 mg/dL    Calculated Osmolality 291 275 - 295 mOsm/kg    eGFR-Cr 57 (L) >=60 mL/min/1.73m2    ALT 35 16 - 61 U/L    AST 33 15 - 37 U/L    Alkaline Phosphatase 89 45 - 117 U/L    Bilirubin, Total 0.7 0.1 - 2.0 mg/dL    Total Protein 7.3 6.4 - 8.2 g/dL    Albumin 3.4 3.4 - 5.0 g/dL    Globulin  3.9 2.8 - 4.4 g/dL    A/G Ratio 0.9 (L) 1.0 - 2.0    Patient Fasting for CMP? Yes      *Note: Due to a large number of results and/or encounters for the requested time period, some results have not been displayed. A complete set of results can be found in Results Review.                  Passed - In person appointment or virtual visit in the past 6 months     Recent Outpatient Visits              2 months ago Routine physical examination    Maamdou Antunez MD    Office Visit    7 months ago BPH with obstruction/lower urinary tract symptoms    [facility], [address], Roberto Joiner MD    Office Visit    12 months ago Essential hypertension with goal blood pressure less than 140/90    [facility]    Office Visit    1 year ago Immunization due    [address], [address], [place]    Nurse Only    1 year ago Routine physical examination    [address] [place] Jitendra Esteban Jason Castellani, MD    Office Visit          Future Appointments         Provider Department Appt Notes    In 3 months [facility] 148 Jeanette Henry 6M f/u               Passed - [facility] or GFRAA > 50     GFR Evaluation  EGFRCR: 57 , resulted on 1/16/2023              metoprolol succinate ER 50 MG Oral Tablet 24 Hr 90 tablet 3     Sig: Take 1 tablet (50 mg total) by mouth daily.        Hypertensive Medications Protocol Passed - 4/14/2023  9:00 AM        Passed - In person appointment in the past 12 or next 3 months     Recent Outpatient Visits              2 months ago Routine physical examination    Jitendra King Yancy Blase, MD    Office Visit    7 months ago BPH with obstruction/lower urinary tract symptoms    [facility], [address], Kika Garcia MD    Office Visit    12 months ago Essential hypertension with goal blood pressure less than 140/90    Jitendra Hoyt Yancy Blase, MD    Office Visit    1 year ago Immunization due    Janiya Aguiar, [address], [facility]    Nurse Only    1 year ago Routine physical examination    Leonid Lu, Ramesh Freeman MD    Office Visit          Future Appointments         Provider Department Appt Notes    In 3 months MD Janiya Banks, 148 Jeanette Henry 6M f/u               Passed - Last BP reading less than 140/90     BP Readings from Last 1 Encounters:  01/20/23 : 138/78                Passed - CMP or BMP in past 6 months     Recent Results (from the past 4392 hour(s))   COMP METABOLIC PANEL (14)    Collection Time: 01/16/23  9:11 AM   Result Value Ref Range    Glucose 109 (H) 70 - 99 mg/dL    Sodium 140 136 - 145 mmol/L    Potassium 4.2 3.5 - 5.1 mmol/L    Chloride 112 98 - 112 mmol/L    CO2 27.0 21.0 - 32.0 mmol/L    Anion Gap 1 0 - 18 mmol/L    BUN 13 7 - 18 mg/dL    Creatinine 1.40 (H) 0.70 - 1.30 mg/dL    BUN/CREA Ratio 9.3 (L) 10.0 - 20.0    Calcium, Total 9.3 8.5 - 10.1 mg/dL    Calculated Osmolality 291 275 - 295 mOsm/kg    eGFR-Cr 57 (L) >=60 mL/min/1.73m2    ALT 35 16 - 61 U/L    AST 33 15 - 37 U/L    Alkaline Phosphatase 89 45 - 117 U/L    Bilirubin, Total 0.7 0.1 - 2.0 mg/dL    Total Protein 7.3 6.4 - 8.2 g/dL    Albumin 3.4 3.4 - 5.0 g/dL    Globulin  3.9 2.8 - 4.4 g/dL    A/G Ratio 0.9 (L) 1.0 - 2.0    Patient Fasting for CMP? Yes      *Note: Due to a large number of results and/or encounters for the requested time period, some results have not been displayed. A complete set of results can be found in Results Review.                  Passed - In person appointment or virtual visit in the past 6 months     Recent Outpatient Visits              2 months ago Routine physical examination    Christianna November, Coby Brunner, MD    Office Visit    7 months ago BPH with obstruction/lower urinary tract symptoms    [facility], [address] [facility],[address], Deya Man MD    Office Visit    12 months ago Essential hypertension with goal blood pressure less than 140/90    [address] [facility] [place]    Office Visit    1 year ago Immunization due    Elijah Causey, [address] [facility],[address], [place]    Nurse Only    1 year ago Routine physical examination    [address] [facility], Coby Brunner, MD    Office Visit          Future Appointments         Provider Department Appt Notes    In 3 months Jeanette Nixon MD [facility], [address] [facility] 6M f/u               Passed - [facility] or GFRAA > 50     GFR Evaluation  EGFRCR: 57 , resulted on 1/16/2023

## 2023-04-14 NOTE — TELEPHONE ENCOUNTER
Refill passed per [employer] protocol.   Requested Prescriptions   Pending Prescriptions Disp Refills    AMLODIPINE 10 MG Oral Tab [Pharmacy Med Name: AMLODIPINE BESYLATE 10 MG TAB] 90 tablet 1     Sig: TAKE 1 TABLET BY MOUTH EVERY DAY       Hypertensive Medications Protocol Passed - 4/14/2023  9:00 AM        Passed - In person appointment in the past 12 or next 3 months     Recent Outpatient Visits              2 months ago Routine physical examination    Jitendra Esposito Cleaster Miles, MD    Office Visit    7 months ago BPH with obstruction/lower urinary tract symptoms    [facility] [address], Gigi Echeverria MD    Office Visit    12 months ago Essential hypertension with goal blood pressure less than 140/90    [facility], [address], Maxine Ham MD    Office Visit    1 year ago Immunization due    Terezapatt Lees, [address], [facility]    Nurse Only    1 year ago Routine physical examination    [address] [facility], Dru Biggs MD    Office Visit          Future Appointments         Provider Department Appt Notes    In 3 months Deon Snellen, [facility] Jeanette Corrales 6M f/u               Passed - Last BP reading less than 140/90     BP Readings from Last 1 Encounters:  01/20/23 : 138/78                Passed - CMP or BMP in past 6 months     Recent Results (from the past 4392 hour(s))   COMP METABOLIC PANEL (14)    Collection Time: 01/16/23  9:11 AM   Result Value Ref Range    Glucose 109 (H) 70 - 99 mg/dL    Sodium 140 136 - 145 mmol/L    Potassium 4.2 3.5 - 5.1 mmol/L    Chloride 112 98 - 112 mmol/L    CO2 27.0 21.0 - 32.0 mmol/L    Anion Gap 1 0 - 18 mmol/L    BUN 13 7 - 18 mg/dL    Creatinine 1.40 (H) 0.70 - 1.30 mg/dL    BUN/CREA Ratio 9.3 (L) 10.0 - 20.0    Calcium, Total 9.3 8.5 - 10.1 mg/dL    Calculated Osmolality 291 275 - 295 mOsm/kg    eGFR-Cr 57 (L) >=60 mL/min/1.73m2    ALT 35 16 - 61 U/L    AST 33 15 - 37 U/L    Alkaline Phosphatase 89 45 - 117 U/L    Bilirubin, Total 0.7 0.1 - 2.0 mg/dL    Total Protein 7.3 6.4 - 8.2 g/dL    Albumin 3.4 3.4 - 5.0 g/dL    Globulin  3.9 2.8 - 4.4 g/dL    A/G Ratio 0.9 (L) 1.0 - 2.0    Patient Fasting for CMP? Yes      *Note: Due to a large number of results and/or encounters for the requested time period, some results have not been displayed. A complete set of results can be found in Results Review. Passed - In person appointment or virtual visit in the past 6 months     Recent Outpatient Visits              2 months ago Routine physical examination    [address] [facility], Mansi Kennedy MD    Office Visit    7 months ago BPH with obstruction/lower urinary tract symptoms    [facility], [address] [facility], Terrie Khan MD    Office Visit    12 months ago Essential hypertension with goal blood pressure less than 140/90    Jitendra Goode, Brinda Albert MD    Office Visit    1 year ago Immunization due    [address], [address], [place]    Nurse Only    1 year ago Routine physical examination    Mansi Goode MD    Office Visit          Future Appointments         Provider Department Appt Notes    In 3 months Kasandra Ardon MD [facility], [address] [place] [address] 6M f/u               Passed - EGFRCR or GFRAA > 50     GFR Evaluation  EGFRCR: 57 , resulted on 1/16/2023              TAMSULOSIN 0.4 MG Oral Cap [Pharmacy Med Name: TAMSULOSIN HCL 0.4 MG CAPSULE] 90 capsule 1     Sig: TAKE 1 CAPSULE BY MOUTH IN THE MORNING.        Genitourinary Medications Passed - 4/14/2023  9:00 AM        Passed - Patient does not have pulmonary hypertension on problem list        Passed - In person appointment or virtual visit in the past 12 mos or appointment in next 3 mos     Recent Outpatient Visits              2 months ago Routine physical examination    Camelia Torrez MD    Office Visit    7 months ago BPH with obstruction/lower urinary tract symptoms    [facility], [address], Ele Allred MD    Office Visit    12 months ago Essential hypertension with goal blood pressure less than 140/90    [address] [facility], María Ham MD    Office Visit    1 year ago Immunization due    [address], [address], [employer]    Nurse Only    1 year ago Routine physical examination    [address] [facility], Camelia Chavez MD    Office Visit          Future Appointments         Provider Department Appt Notes    In 3 months Annalise Cassidy MD [address], [address] [facility] 6M f/u                 METOPROLOL SUCCINATE ER 50 MG Oral Tablet [address] [place] [address] 70 [Pharmacy Med Name: METOPROLOL SUCC ER 50 MG TAB] 90 tablet 1     Sig: Take 1 tablet (50 mg total) by mouth in the morning.        Hypertensive Medications Protocol Passed - 4/14/2023  9:00 AM        Passed - In person appointment in the past 12 or next 3 months     Recent Outpatient Visits              2 months ago Routine physical examination    Camelia Torrez MD    Office Visit    7 months ago BPH with obstruction/lower urinary tract symptoms    [address] [facility], Ele Allred MD    Office Visit    12 months ago Essential hypertension with goal blood pressure less than 140/90    [address] [facility], María Ham MD    Office Visit    1 year ago Immunization due    [address], [address], [employer]    Nurse Only    1 year ago Routine physical examination    [address] [place], [place] [address]Jitendra Cosette Aho, MD    Office Visit          Future Appointments         Provider Department Appt Notes    In 3 months Latisha Chao MD [facility], [address] [place] [address] 6M f/u               Passed - Last BP reading less than 140/90     BP Readings from Last 1 Encounters:  01/20/23 : 138/78                Passed - CMP or BMP in past 6 months     Recent Results (from the past 4392 hour(s))   COMP METABOLIC PANEL (14)    Collection Time: 01/16/23  9:11 AM   Result Value Ref Range    Glucose 109 (H) 70 - 99 mg/dL    Sodium 140 136 - 145 mmol/L    Potassium 4.2 3.5 - 5.1 mmol/L    Chloride 112 98 - 112 mmol/L    CO2 27.0 21.0 - 32.0 mmol/L    Anion Gap 1 0 - 18 mmol/L    BUN 13 7 - 18 mg/dL    Creatinine 1.40 (H) 0.70 - 1.30 mg/dL    BUN/CREA Ratio 9.3 (L) 10.0 - 20.0    Calcium, Total 9.3 8.5 - 10.1 mg/dL    Calculated Osmolality 291 275 - 295 mOsm/kg    eGFR-Cr 57 (L) >=60 mL/min/1.73m2    ALT 35 16 - 61 U/L    AST 33 15 - 37 U/L    Alkaline Phosphatase 89 45 - 117 U/L    Bilirubin, Total 0.7 0.1 - 2.0 mg/dL    Total Protein 7.3 6.4 - 8.2 g/dL    Albumin 3.4 3.4 - 5.0 g/dL    Globulin  3.9 2.8 - 4.4 g/dL    A/G Ratio 0.9 (L) 1.0 - 2.0    Patient Fasting for CMP? Yes      *Note: Due to a large number of results and/or encounters for the requested time period, some results have not been displayed. A complete set of results can be found in Results Review.                  Passed - In person appointment or virtual visit in the past 6 months     Recent Outpatient Visits              2 months ago Routine physical examination    Danyelle Martin MD    Office Visit    7 months ago BPH with obstruction/lower urinary tract symptoms    [address] [place], [place] [address]Sil MD    Office Visit    12 months ago Essential hypertension with goal blood pressure less than 140/90    [facility], [address], Robert Ham MD    Office Visit    1 year ago Immunization due    Mari aGuadalupe Wilson, [address], [place]    Nurse Only    1 year ago Routine physical examination    [address] [facility], Thierno Gee MD    Office Visit          Future Appointments         Provider Department Appt Notes    In 3 months MD [facility] [address] [facility] [place] 6M f/u               Passed - [facility] or GFRAA > 50     GFR Evaluation  EGFRCR: 57 , resulted on 1/16/2023

## 2023-04-14 NOTE — TELEPHONE ENCOUNTER
Refill passed per [employer] protocol.    Requested Prescriptions   Pending Prescriptions Disp Refills    METOPROLOL SUCCINATE  MG Oral Tablet 24 Hr [Pharmacy Med Name: METOPROLOL SUCC  MG TAB] 90 tablet 1     Sig: TAKE 1 TABLET BY MOUTH EVERY DAY       Hypertensive Medications Protocol Passed - 4/14/2023  9:00 AM        Passed - In person appointment in the past 12 or next 3 months     Recent Outpatient Visits              2 months ago Routine physical examination    Jitendra Martin Cosette Aho, MD    Office Visit    7 months ago BPH with obstruction/lower urinary tract symptoms    [facility], [address] [facility],[address], Gigi Echeverria MD    Office Visit    12 months ago Essential hypertension with goal blood pressure less than 140/90    [facility], [address], Malathi Ham MD    Office Visit    1 year ago Immunization due    [address], [address], [employer]    Nurse Only    1 year ago Routine physical examination    [address], [address], Danyelle Marshall MD    Office Visit          Future Appointments         Provider Department Appt Notes    In 3 months Latisha Chao MD [facility], [address] [facility] 6M f/u               Passed - Last BP reading less than 140/90     BP Readings from Last 1 Encounters:  01/20/23 : 138/78                Passed - CMP or BMP in past 6 months     Recent Results (from the past 4392 hour(s))   COMP METABOLIC PANEL (14)    Collection Time: 01/16/23  9:11 AM   Result Value Ref Range    Glucose 109 (H) 70 - 99 mg/dL    Sodium 140 136 - 145 mmol/L    Potassium 4.2 3.5 - 5.1 mmol/L    Chloride 112 98 - 112 mmol/L    CO2 27.0 21.0 - 32.0 mmol/L    Anion Gap 1 0 - 18 mmol/L    BUN 13 7 - 18 mg/dL    Creatinine 1.40 (H) 0.70 - 1.30 mg/dL    BUN/CREA Ratio 9.3 (L) 10.0 - 20.0    Calcium, Total 9.3 8.5 - 10.1 mg/dL    Calculated Osmolality 291 275 - 295 mOsm/kg    eGFR-Cr 57 (L) >=60 mL/min/1.73m2    ALT 35 16 - 61 U/L    AST 33 15 - 37 U/L    Alkaline Phosphatase 89 45 - 117 U/L    Bilirubin, Total 0.7 0.1 - 2.0 mg/dL    Total Protein 7.3 6.4 - 8.2 g/dL    Albumin 3.4 3.4 - 5.0 g/dL    Globulin  3.9 2.8 - 4.4 g/dL    A/G Ratio 0.9 (L) 1.0 - 2.0    Patient Fasting for CMP? Yes      *Note: Due to a large number of results and/or encounters for the requested time period, some results have not been displayed. A complete set of results can be found in Results Review.                  Passed - In person appointment or virtual visit in the past 6 months     Recent Outpatient Visits              2 months ago Routine physical examination    Spencer Lucas MD    Office Visit    7 months ago BPH with obstruction/lower urinary tract symptoms    [facility], [address] [facility],[address], Deya Man MD    Office Visit    12 months ago Essential hypertension with goal blood pressure less than 140/90    Jitendra Blankenship, Rebecca Gambino MD    Office Visit    1 year ago Immunization due    [address], [address], [place]    Nurse Only    1 year ago Routine physical examination    Spencer Blankenship MD    Office Visit          Future Appointments         Provider Department Appt Notes    In 3 months Jeanette Nixon MD [address], [address] [facility] 6M f/u               Passed - [facility] or GFRAA > 50     GFR Evaluation  EGFRCR: 57 , resulted on 1/16/2023                Recent Outpatient Visits              2 months ago Routine physical examination    Spencer Lucas MD    Office Visit    7 months ago BPH with obstruction/lower urinary tract symptoms    [facility] Lawerence Apley, MD    Office Visit    12 months ago Essential hypertension with goal blood pressure less than 140/90    [facility], [address], Ade Ham MD    Office Visit    1 year ago Immunization due    [address], [address], [place]    Nurse Only    1 year ago Routine physical examination    [address] [facility], Ade Ham MD    Office Visit           Future Appointments         Provider Department Appt Notes    In 3 months Theodor Brittle, MD [address], [address] [facility] f/u

## 2023-04-17 RX ORDER — TAMSULOSIN HYDROCHLORIDE 0.4 MG/1
0.4 CAPSULE ORAL EVERY MORNING
Qty: 90 CAPSULE | Refills: 3 | Status: SHIPPED | OUTPATIENT
Start: 2023-04-17

## 2023-07-11 ENCOUNTER — OFFICE VISIT (OUTPATIENT)
Dept: SURGERY | Facility: CLINIC | Age: 63
End: 2023-07-11

## 2023-07-11 DIAGNOSIS — N13.8 BPH WITH OBSTRUCTION/LOWER URINARY TRACT SYMPTOMS: ICD-10-CM

## 2023-07-11 DIAGNOSIS — R97.20 ELEVATED PSA: Primary | ICD-10-CM

## 2023-07-11 DIAGNOSIS — C64.1 MALIGNANT NEOPLASM OF RIGHT KIDNEY (HCC): ICD-10-CM

## 2023-07-11 DIAGNOSIS — N40.1 BPH WITH OBSTRUCTION/LOWER URINARY TRACT SYMPTOMS: ICD-10-CM

## 2023-07-11 PROCEDURE — 99213 OFFICE O/P EST LOW 20 MIN: CPT | Performed by: UROLOGY

## 2023-07-11 NOTE — PROGRESS NOTES
Jacinto Torres is a 61year old male. HPI:   Patient presents with:  BPH  elevated psa: Rise in the PSA       22-year-old male in follow-up to a previous visit August 29, 2022 with a history of renal cell cancer status post robot-assisted laparoscopic nephrectomy [address] [facility] April 2015 without evidence of disease recurrence since then. Referred for an elevated serum PSA first noted January 2023 at 4.66. Repeat PSA April 14, 2023 5.62. He had a mildly abnormal prostate exam at his last digital prostate exam in August.  He denies any known family history of prostate cancer or changes in his urination symptoms. He denies any bone pain or weight loss. IPSS score today is 11, quality-of-life index score is 3.       HISTORY:  Past Medical History:   Diagnosis Date    Back problem     lumbar microdiscectomy    BPH (benign prostatic hyperplasia)     Cardiovascular disorder 2010    Cardiac calcium scan- normal    Celiac disease     Disc displacement 2010    Microdiscectomy L4-5; good outcome, Completed at [facility] blood pressure     High cholesterol     Hypertension     Knee injuries     bilateral, arthroscopy, detached patella Rt /Lt Football injury    Leg cramps 2013    Severe leg cramps with lisinopril-HCTZ    Osteoarthritis     Renal cell carcinoma ([facility] [address] April 23, 2015    Right kidney removed    Tonsillitis 1993    tonsillectomy    Visual impairment     reading glasses      Past Surgical History:   Procedure Laterality Date    COLONOSCOPY      KNEE ARTHROSCOPY Bilateral     knee injuries    LAPAROSCOPY, SURGICAL; NEPHRECTOMY Right April 2015    Robotic surgery at [address] [place] [address] Dr WHITAKER L4-5; good outcome, Completed at [facility] (Disc displacement)    RECTUM SURGERY PROCEDURE UNLISTED  1994    repair of rectal tear    SPINE SURGERY PROCEDURE UNLISTED      lumbar microdiscectomy    TONSILLECTOMY      tonstillitis 1993    UPPER GI ENDOSCOPY,EXAM        Family History Problem Relation Age of Onset    Cancer Father         salivary and kidney    Hypertension Mother     Arthritis Mother         gout      Social History:   Social History     Socioeconomic History    Marital status:     Number of children: 2   Occupational History    Occupation:  / [employer]     Employer: [employer]   Tobacco Use    Smoking status: Former     Types: Cigars    Smokeless tobacco: Never    Tobacco comments:     tobacco on and off for 20 years   Vaping Use    Vaping Use: Never used   Substance and Sexual Activity    Alcohol use: Yes     Comment: 4 times a week    Drug use: Yes     Types: Cannabis     Comment: daily    Sexual activity: Not Currently     Partners: Female   Other Topics Concern    Caffeine Concern Yes     Comment: coffee, chocolate, 1 cup daily        Medications (Active prior to today's visit):  Current Outpatient Medications   Medication Sig Dispense Refill    Olmesartan Medoxomil 40 MG Oral Tab Take 1 tablet (40 mg total) by mouth daily. Temporary Refill, Call doctor's office, Appointment needed for further refills. 90 tablet 3    tamsulosin 0.4 MG Oral Cap Take 1 capsule (0.4 mg total) by mouth every morning. 90 capsule 3    amLODIPine 10 MG Oral Tab Take 1 tablet (10 mg total) by mouth daily. 90 tablet 3    metoprolol succinate  MG Oral Tablet 24 Hr Take 1 tablet (100 mg total) by mouth daily. 90 tablet 3    metoprolol succinate ER 50 MG Oral Tablet 24 Hr Take 1 tablet (50 mg total) by mouth daily. 90 tablet 3    cholecalciferol 125 MCG (5000 UT) Oral Tab Take 1 tablet (5,000 Units total) by mouth daily. omega-3 fatty acids 1000 MG Oral Cap Take 1,000 mg by mouth daily. Vitamin E 180 MG (400 UNIT) Oral Cap Take by mouth. psyllium (METAMUCIL) 28 % Oral Powd Pack Take 1 packet by mouth 2 (two) times daily. Digital Therapy (HELLO HEART BLOOD PRESSURE) Does not apply Kit daily.       Multiple Vitamins-Minerals (MULTIVITAMIN ADULT OR) Take by mouth. Allergies:    Lisinopril-Hydrochl*        Comment:Other reaction(s): Other             Severe leg cramps and excessive urination             Severe leg cramps  Wheat Gluten            DIARRHEA, NAUSEA AND VOMITING      ROS:       PHYSICAL EXAM:        ASSESSMENT/PLAN:   Assessment   Elevated psa  (primary encounter diagnosis)  Bph with obstruction/lower urinary tract symptoms  Malignant neoplasm of right kidney (hcc)    Reviewed findings at length with patient. Discussed options for management. We agreed to proceed with an MRI of the prostate in anticipation of possible MRI ultrasound fusion biopsy depending on results. I asked the patient to follow-up in 3 to 4 weeks to review those results. Orders This Visit:  No orders of the defined types were placed in this encounter.       Meds This Visit:  Requested Prescriptions      No prescriptions requested or ordered in this encounter       Imaging & Referrals:  MRI PROSTATE (W+WO)(CPT=72197)     7/11/2023  Rosemarie Chao MD

## 2023-07-21 ENCOUNTER — OFFICE VISIT (OUTPATIENT)
Dept: INTERNAL MEDICINE CLINIC | Facility: CLINIC | Age: 63
End: 2023-07-21

## 2023-07-21 VITALS
HEIGHT: 69 IN | RESPIRATION RATE: 18 BRPM | TEMPERATURE: 98 F | SYSTOLIC BLOOD PRESSURE: 148 MMHG | WEIGHT: 189 LBS | OXYGEN SATURATION: 100 % | DIASTOLIC BLOOD PRESSURE: 74 MMHG | BODY MASS INDEX: 27.99 KG/M2 | HEART RATE: 72 BPM

## 2023-07-21 DIAGNOSIS — N40.0 BENIGN PROSTATIC HYPERPLASIA, UNSPECIFIED WHETHER LOWER URINARY TRACT SYMPTOMS PRESENT: ICD-10-CM

## 2023-07-21 DIAGNOSIS — I10 ESSENTIAL HYPERTENSION WITH GOAL BLOOD PRESSURE LESS THAN 140/90: Primary | ICD-10-CM

## 2023-07-21 DIAGNOSIS — M51.9 LUMBAR DISC DISEASE: ICD-10-CM

## 2023-07-21 DIAGNOSIS — R97.20 ELEVATED PSA: ICD-10-CM

## 2023-07-21 DIAGNOSIS — N18.31 STAGE 3A CHRONIC KIDNEY DISEASE (HCC): ICD-10-CM

## 2023-07-21 PROCEDURE — 3077F SYST BP >= 140 MM HG: CPT | Performed by: INTERNAL MEDICINE

## 2023-07-21 PROCEDURE — 3008F BODY MASS INDEX DOCD: CPT | Performed by: INTERNAL MEDICINE

## 2023-07-21 PROCEDURE — 3078F DIAST BP <80 MM HG: CPT | Performed by: INTERNAL MEDICINE

## 2023-07-21 PROCEDURE — 99214 OFFICE O/P EST MOD 30 MIN: CPT | Performed by: INTERNAL MEDICINE

## 2023-08-10 ENCOUNTER — HOSPITAL ENCOUNTER (OUTPATIENT)
Dept: MRI IMAGING | Facility: HOSPITAL | Age: 63
Discharge: HOME | End: 2023-08-10
Attending: UROLOGY
Payer: COMMERCIAL

## 2023-08-10 DIAGNOSIS — R97.20 ELEVATED PSA: ICD-10-CM

## 2023-08-10 PROCEDURE — A9575 INJ GADOTERATE MEGLUMI 0.1ML: HCPCS | Performed by: UROLOGY

## 2023-08-10 PROCEDURE — 72197 MRI PELVIS W/O & W/DYE: CPT | Performed by: UROLOGY

## 2023-08-10 RX ORDER — GADOTERATE MEGLUMINE 376.9 MG/ML
20 INJECTION INTRAVENOUS
Status: COMPLETED | OUTPATIENT
Start: 2023-08-10 | End: 2023-08-10

## 2023-08-10 RX ADMIN — GADOTERATE MEGLUMINE 18 ML: 376.9 INJECTION INTRAVENOUS at 18:10:00

## 2023-08-22 ENCOUNTER — TELEPHONE (OUTPATIENT)
Dept: SURGERY | Facility: CLINIC | Age: 63
End: 2023-08-22

## 2023-08-22 NOTE — TELEPHONE ENCOUNTER
- s/w pt; identity verified with name and   - Read [facility] message to pt. Pt acknowledged understanding, but does have several things happening this month. Agrees to have surgical scheduling contact pt to discuss scheduling of MRI US fusion prostate biopsy.    - Routed message to surgical scheduling    ----- Message from Fadia Barrientos MD sent at 2023 10:32 AM CDT -----  Urology staff,  Please contact this patient. Let him know that I reviewed his recent prostate MRI. This shows an intermediate risk lesion within the prostate classified as PI-RADS 3. Given his mild to moderately abnormal prostate exam and his elevated PSA I would still recommend proceeding with an MRI ultrasound fusion prostate biopsy. This biopsy is done in the radiology department at the hospital and uses the information gathered from the MRI to focally target specific areas in the prostate. If the patient is agreeable, I have sent this information to Brett Esquivel to work on getting him scheduled. I will send a prescription for Omnicef and ciprofloxacin as per standard.

## 2023-08-23 NOTE — TELEPHONE ENCOUNTER
Spoke with patient, possible Uro Ivan Fusion Prostate Biopsy, Friday 09/15/2023, l/vm for radiology, once confirmed will contact patient with all instructions.

## 2023-09-06 NOTE — DISCHARGE INSTRUCTIONS
Performed by Dr. Abdirizak Grace MD.    1. Hematuria (blood in urine) and/or blood in your bowel movement    Usually minimal-lasting 24 hours is expected  Hydrating with additional fluids such as water an juices is necessary to minimize hematuria. Blood may also be present in stool after bowel movement. Avoid straining during elimination, avoid constipating food items. Call ordering doctor if bleeding persists or worsens, such as a darker in appearance or thicker in consistency. 2. Activity  Rest quietly for the next 24 hours. Avoid straining, lifting heavy items, or strenuous physical activity for approximately 2 days. 3. Infection  Continue with antibiotics as ordered. If further signs or symptoms of infection occur such as:  fever (temp greater than 100) or severe discomfort persist, call the ordering doctor. Use tylenol for discomfort. Avoid aspirin, NSAIDs, and Ibuprofen products for 48 hours. 4.  Diet  No dietary restrictions except food products that cause constipation. Hydrate well. 5.  Results  Call/follow-up with ordering doctor for results, usually 5-7 days. 6.  Follow-up  The Radiology Department RN's are available to answer questions if you are unable to contact the ordering doctor.   Call [facility] Radiology Department at [phone] Monday through Friday 8AM - 4PM

## 2023-09-15 ENCOUNTER — HOSPITAL ENCOUNTER (OUTPATIENT)
Dept: ULTRASOUND IMAGING | Facility: HOSPITAL | Age: 63
Discharge: HOME | End: 2023-09-15
Attending: UROLOGY
Payer: COMMERCIAL

## 2023-09-15 VITALS — WEIGHT: 190 LBS | BODY MASS INDEX: 28.14 KG/M2 | HEIGHT: 69 IN

## 2023-09-15 DIAGNOSIS — R97.20 ELEVATED PSA: ICD-10-CM

## 2023-09-15 PROCEDURE — 76942 ECHO GUIDE FOR BIOPSY: CPT | Performed by: UROLOGY

## 2023-09-15 PROCEDURE — 55700 BIOPSY OF PROSTATE,NEEDLE/PUNCH: CPT | Performed by: UROLOGY

## 2023-09-15 RX ORDER — CIPROFLOXACIN 500 MG/1
500 TABLET, FILM COATED ORAL ONCE
Qty: 1 TABLET | Refills: 0 | Status: SHIPPED | OUTPATIENT
Start: 2023-09-15 | End: 2023-09-15

## 2023-09-15 NOTE — OPERATIVE REPORT
Diagnosis:   elevated PSA, abnormal prostate MRI  Procedure: Transrectal ultrasound and prostate biopsy. Anesthesia: 1% lidocaine 7 Ml   Prostate Volume:  79 cc  Prostate US abnormalities: none  Seminal Vesicles: grossly normal.  Bladder: not well seen  Biopsies obtained: 3 from KAUSHIK, 6 additional samples from the base, mid and apex bilaterally  Areas biopsied: Right and left apex, mid and bases at mid and lateral areas each.    Complications:none  Blood loss: minimal.

## 2023-09-15 NOTE — IMAGING NOTE
PATIENT ARRIVAL QNSW:8901    ULTRASOUND TECH: LUIS FERNANDO VACA     PHYSICIAN TO PERFORM PROCEDURE:ANDI FLORES    HISTORY TAKEN: ELEVATED PSA, ABNORMAL PSA    BASE LINE VITAL SIGNS: /84 HR  55    ANTIBIOTICS TAKEN: Y    FLEETS ENEMA TAKEN Y    PROCEDURE EXPLAINED:Y    CONSENT OBTAINED:1148    PHYSICIAN ARRIVAL TIME:1209    TIME OUT COMPLETED @:1211    LIDOCAINE INSTILLED UTILIZING 22 G-20 CM CHIBA NEEDLE @ 0890     IMAGES/SCANS CORRELATED: 1217    SAMPLING BEGUN @:1224    CORE SAMPLES WITH 18 G - 25 CM BARD BIOPSY NEEDLE:    SITES:     REGION OF INTEREST ([place]),  3 PASSES    RIGHT MID/BASE/APEX,  1  PASS EACH SITE    LEFT MID/BASE/APEX, 1 PASS EACH SITE     SAMPLES APPLIED TO TELFA PRIOR TO IMMERSING INTO FORMALIN 10% SOLUTION    SAMPLING COMPLETED:1227    POST PROCEDURE VITAL SIGNS: /89 HR 65 AT 1230    SPECIMENS TO LAB/CYTOLOGY/GROSS ROOM BY DEE AKINS RN    AVS INSTRUCTIONS PROVIDED    PATIENT DISCHARGED AT 1240 BY Evangelina Melo RN

## 2023-09-22 ENCOUNTER — LAB ENCOUNTER (OUTPATIENT)
Dept: LAB | Age: 63
End: 2023-09-22
Attending: INTERNAL MEDICINE
Payer: COMMERCIAL

## 2023-09-22 ENCOUNTER — OFFICE VISIT (OUTPATIENT)
Dept: INTERNAL MEDICINE CLINIC | Facility: CLINIC | Age: 63
End: 2023-09-22

## 2023-09-22 VITALS
WEIGHT: 185 LBS | HEIGHT: 69 IN | HEART RATE: 74 BPM | DIASTOLIC BLOOD PRESSURE: 82 MMHG | BODY MASS INDEX: 27.4 KG/M2 | SYSTOLIC BLOOD PRESSURE: 140 MMHG | RESPIRATION RATE: 18 BRPM | TEMPERATURE: 98 F

## 2023-09-22 DIAGNOSIS — N40.0 BENIGN PROSTATIC HYPERPLASIA, UNSPECIFIED WHETHER LOWER URINARY TRACT SYMPTOMS PRESENT: ICD-10-CM

## 2023-09-22 DIAGNOSIS — I10 ESSENTIAL HYPERTENSION WITH GOAL BLOOD PRESSURE LESS THAN 140/90: Primary | ICD-10-CM

## 2023-09-22 DIAGNOSIS — M10.9 GOUT, UNSPECIFIED CAUSE, UNSPECIFIED CHRONICITY, UNSPECIFIED SITE: ICD-10-CM

## 2023-09-22 LAB — URATE SERPL-MCNC: 4 MG/DL

## 2023-09-22 PROCEDURE — 90471 IMMUNIZATION ADMIN: CPT | Performed by: INTERNAL MEDICINE

## 2023-09-22 PROCEDURE — 3079F DIAST BP 80-89 MM HG: CPT | Performed by: INTERNAL MEDICINE

## 2023-09-22 PROCEDURE — 90686 IIV4 VACC NO PRSV 0.5 ML IM: CPT | Performed by: INTERNAL MEDICINE

## 2023-09-22 PROCEDURE — 99214 OFFICE O/P EST MOD 30 MIN: CPT | Performed by: INTERNAL MEDICINE

## 2023-09-22 PROCEDURE — 3077F SYST BP >= 140 MM HG: CPT | Performed by: INTERNAL MEDICINE

## 2023-09-22 PROCEDURE — 84550 ASSAY OF BLOOD/URIC ACID: CPT

## 2023-09-22 PROCEDURE — 36415 COLL VENOUS BLD VENIPUNCTURE: CPT

## 2023-09-22 PROCEDURE — 3008F BODY MASS INDEX DOCD: CPT | Performed by: INTERNAL MEDICINE

## 2023-09-22 RX ORDER — TERAZOSIN 2 MG/1
2 CAPSULE ORAL NIGHTLY
Qty: 30 CAPSULE | Refills: 5 | Status: SHIPPED | OUTPATIENT
Start: 2023-09-22

## 2023-09-22 NOTE — PATIENT INSTRUCTIONS
Stop tamsulosin  Start terazosin one a day. Contact the office in 2-4 weeks with blood pressure readings. We will likely need to increase the dose to 5 or 10 mg a day.

## 2023-09-25 ENCOUNTER — OFFICE VISIT (OUTPATIENT)
Dept: SURGERY | Facility: CLINIC | Age: 63
End: 2023-09-25

## 2023-09-25 DIAGNOSIS — N40.1 BPH WITH OBSTRUCTION/LOWER URINARY TRACT SYMPTOMS: ICD-10-CM

## 2023-09-25 DIAGNOSIS — N13.8 BPH WITH OBSTRUCTION/LOWER URINARY TRACT SYMPTOMS: ICD-10-CM

## 2023-09-25 DIAGNOSIS — R97.20 ELEVATED PSA: Primary | ICD-10-CM

## 2023-09-25 DIAGNOSIS — C64.1 MALIGNANT NEOPLASM OF RIGHT KIDNEY (HCC): ICD-10-CM

## 2023-09-25 PROCEDURE — 99213 OFFICE O/P EST LOW 20 MIN: CPT | Performed by: UROLOGY

## 2023-09-25 NOTE — PROGRESS NOTES
Digna Christensen is a 61year old male. HPI:   Patient presents with:  elevated psa: Discuss prostate bx results    61-year-old male status post MRI ultrasound fusion prostate biopsy September 15, 2023 for an elevated serum PSA of 5.6 2 April 2023. MRI demonstrated borderline findings with PI-RADS 3 classification. Has a history of renal cell cancer status post robotic assisted laparoscopic nephrectomy at the [address] [place] [address] April 2015 without evidence of disease recurrence since then. Digital prostate exams demonstrated slightly more prominent prostate on the right than the left but without any findings. Biopsy did not demonstrate cancer but did demonstrate an area at the right apex demonstrating focal atypical small acinar proliferation. I reviewed these findings and the pathology implications with the patient today.         HISTORY:  Past Medical History:   Diagnosis Date    Back problem     lumbar microdiscectomy    BPH (benign prostatic hyperplasia)     Cardiovascular disorder 2010    Cardiac calcium scan- normal    Celiac disease     Disc displacement 2010    Microdiscectomy L4-5; good outcome, Completed at [facility] blood pressure     High cholesterol     Hypertension     Knee injuries     bilateral, arthroscopy, detached patella Rt /Lt Football injury    Leg cramps 2013    Severe leg cramps with lisinopril-HCTZ    Osteoarthritis     Renal cell carcinoma ([address] April 23, 2015    Right kidney removed    Tonsillitis 1993    tonsillectomy    Visual impairment     reading glasses      Past Surgical History:   Procedure Laterality Date    COLONOSCOPY      KNEE ARTHROSCOPY Bilateral     knee injuries    LAPAROSCOPY, SURGICAL; NEPHRECTOMY Right April 2015    Robotic surgery at [address] [place] [address] Dr WHITAKER L4-5; good outcome, Completed at [facility] (Disc displacement)    RECTUM SURGERY PROCEDURE UNLISTED  1994    repair of rectal tear    SPINE SURGERY PROCEDURE UNLISTED lumbar microdiscectomy    TONSILLECTOMY      tonstillitis 1993    UPPER GI ENDOSCOPY,EXAM        Family History   Problem Relation Age of Onset    Cancer Father         salivary and kidney    Hypertension Mother     Arthritis Mother         gout      Social History:   Social History     Socioeconomic History    Marital status:     Number of children: 2   Occupational History    Occupation:  / [employer]     Employer: [employer]   Tobacco Use    Smoking status: Former     Types: Cigars    Smokeless tobacco: Never    Tobacco comments:     tobacco on and off for 20 years   Vaping Use    Vaping Use: Never used   Substance and Sexual Activity    Alcohol use: Yes     Comment: 4 times a week    Drug use: Yes     Types: Cannabis     Comment: daily    Sexual activity: Not Currently     Partners: Female   Other Topics Concern    Caffeine Concern Yes     Comment: coffee, chocolate, 1 cup daily        Medications (Active prior to today's visit):  Current Outpatient Medications   Medication Sig Dispense Refill    terazosin 2 MG Oral Cap Take 1 capsule (2 mg total) by mouth nightly. 30 capsule 5    Olmesartan Medoxomil 40 MG Oral Tab Take 1 tablet (40 mg total) by mouth daily. Temporary Refill, Call doctor's office, Appointment needed for further refills. 90 tablet 3    amLODIPine 10 MG Oral Tab Take 1 tablet (10 mg total) by mouth daily. 90 tablet 3    metoprolol succinate  MG Oral Tablet 24 Hr Take 1 tablet (100 mg total) by mouth daily. 90 tablet 3    metoprolol succinate ER 50 MG Oral Tablet 24 Hr Take 1 tablet (50 mg total) by mouth daily. 90 tablet 3    cholecalciferol 125 MCG (5000 UT) Oral Tab Take 1 tablet (5,000 Units total) by mouth daily. omega-3 fatty acids 1000 MG Oral Cap Take 1,000 mg by mouth daily. Vitamin E 180 MG (400 UNIT) Oral Cap Take by mouth. psyllium (METAMUCIL) 28 % Oral Powd Pack Take 1 packet by mouth 2 (two) times daily.       Digital Therapy (HELLO HEART BLOOD PRESSURE) Does not apply Kit daily. Multiple Vitamins-Minerals (MULTIVITAMIN ADULT OR) Take by mouth. Allergies:    Lisinopril-Hydrochl*        Comment:Other reaction(s): Other             Severe leg cramps and excessive urination             Severe leg cramps  Wheat Gluten            DIARRHEA, NAUSEA AND VOMITING  Wheat Germ              OTHER (SEE COMMENTS)      ROS:       PHYSICAL EXAM:        ASSESSMENT/PLAN:   Assessment   Elevated psa  (primary encounter diagnosis)  Bph with obstruction/lower urinary tract symptoms  Malignant neoplasm of right kidney (hcc)    Reviewed findings at length with patient. Recommended follow-up in 6 months with a repeat PSA and digital prostate exam at that time. He understands that with findings of ASAP on his prostate biopsy a repeat biopsy is typically recommended within a year. Follow-up in 6 months with a repeat PSA and digital prostate exam at that time.          Orders This Visit:  Orders Placed This Encounter      PSA Diagnostic [E]      Meds This Visit:  Requested Prescriptions      No prescriptions requested or ordered in this encounter       Imaging & Referrals:  None     9/25/2023  Reza Burton MD

## 2023-10-11 ENCOUNTER — TELEPHONE (OUTPATIENT)
Facility: CLINIC | Age: 63
End: 2023-10-11

## 2023-10-11 ENCOUNTER — OFFICE VISIT (OUTPATIENT)
Facility: CLINIC | Age: 63
End: 2023-10-11

## 2023-10-11 VITALS
BODY MASS INDEX: 27.85 KG/M2 | HEART RATE: 64 BPM | HEIGHT: 69 IN | WEIGHT: 188 LBS | SYSTOLIC BLOOD PRESSURE: 152 MMHG | DIASTOLIC BLOOD PRESSURE: 81 MMHG

## 2023-10-11 DIAGNOSIS — Z12.11 ENCOUNTER FOR SCREENING COLONOSCOPY: ICD-10-CM

## 2023-10-11 DIAGNOSIS — R19.4 CHANGE IN BOWEL HABITS: Primary | ICD-10-CM

## 2023-10-11 DIAGNOSIS — Z12.11 COLON CANCER SCREENING: Primary | ICD-10-CM

## 2023-10-11 PROCEDURE — 99203 OFFICE O/P NEW LOW 30 MIN: CPT | Performed by: INTERNAL MEDICINE

## 2023-10-11 PROCEDURE — 3008F BODY MASS INDEX DOCD: CPT | Performed by: INTERNAL MEDICINE

## 2023-10-11 PROCEDURE — 3079F DIAST BP 80-89 MM HG: CPT | Performed by: INTERNAL MEDICINE

## 2023-10-11 PROCEDURE — 3077F SYST BP >= 140 MM HG: CPT | Performed by: INTERNAL MEDICINE

## 2023-10-11 RX ORDER — POLYETHYLENE GLYCOL 3350, SODIUM SULFATE ANHYDROUS, SODIUM BICARBONATE, SODIUM CHLORIDE, POTASSIUM CHLORIDE 236; 22.74; 6.74; 5.86; 2.97 G/4L; G/4L; G/4L; G/4L; G/4L
4 POWDER, FOR SOLUTION ORAL ONCE
Qty: 1 EACH | Refills: 0 | Status: SHIPPED | OUTPATIENT
Start: 2023-10-11 | End: 2023-10-11

## 2023-10-11 NOTE — PROGRESS NOTES
HPI:    Patient ID: Arnold Mcfarlane is a 61year old man with history hypertension, dyslipidemia who I saw previously 10 years ago for average risk screening colonoscopy examination. 7 years ago, work-up for abdominal symptoms led to an abdominal ultrasound which showed incidental finding of a kidney lesion. This turned out to be a suspected malignancy and Mr. Silva underwent a robotic nephrectomy surgery [facility] April 2015. Sounds like it went very well. Before after that, Mr. Mancilla Home was suffering a syndrome of vomiting, oily greasy stools and abnormal weight loss. It sounds like the weight loss was significant. This got eclipsed by the work-up of the kidney lesion. His wife researched his symptoms and advised him to try going gluten-free. Mr. Mancilla Home went gluten-free as well as going off all possible GMO foods and recovered. The vomiting, greasy oily stools resolved and he has gained weight. Very attentive to hydration and avoiding any nephrotoxins since the nephrectomy surgery. Comes in today looking remarkably strong and healthy. Recent reassuring prostate biopsy with Dr. Hussain Rodriguez.     Wt Readings from Last 20 Encounters:  10/11/23 : 188 lb (85.3 kg)  09/22/23 : 185 lb (83.9 kg)  09/15/23 : 190 lb (86.2 kg)  07/21/23 : 189 lb (85.7 kg)  01/20/23 : 186 lb (84.4 kg)  04/18/22 : 181 lb (82.1 kg)  10/15/21 : 188 lb (85.3 kg)  02/01/21 : 196 lb 9.6 oz (89.2 kg)  10/30/20 : 194 lb 14.4 oz (88.4 kg)  07/22/20 : 186 lb (84.4 kg)  07/13/20 : 190 lb (86.2 kg)  10/28/19 : 188 lb (85.3 kg)  06/17/19 : 191 lb (86.6 kg)  04/22/19 : 191 lb 9.6 oz (86.9 kg)  03/20/19 : 185 lb (83.9 kg)  03/16/19 : 186 lb 12.8 oz (84.7 kg)  12/17/18 : 180 lb (81.6 kg)  10/22/18 : 179 lb 4.8 oz (81.3 kg)  06/18/18 : 180 lb (81.6 kg)  04/23/18 : 183 lb 1.6 oz (83.1 kg)        Previous EGD examination: n  Previous colonoscopy(ies): 2013    [facility]    Review of Systems    ======================    LUCIEN SILVA  Date of Birth: 03/05/1960  Date:                                    06/24/2013 9:30 AM   Historian:                            self  Visit Type:                           Consult        This 48year old  patient was referred by Steve Cueva for evaluation and treatment. The patient is a 48year old male who presents with colon cancer screening. History of Present Illness:  1.  colon cancer screening      Mr Lucero Jackson is a 48yr old man with history of hypertension who is due for screening colonoscopy examination. On review of systems, he denies GERD symptoms, abdominal pain, change in bowel patterns. He states that with his employment and heavy lifting, straining he does have a \"hemorrhoid\" which flares up occasionally. That causes intermittent rectal bleeding. He describes a distant history of a one-month hospitalization in approximately 1981 for what he believes to have been pancreatitis and possibly a bowel or intestinal issue. He recalls undergoing an upper endoscopy during that hospitalization. He did not undergo cholecystectomy. Pancreatitis may have been due to vitamins and supplement use. He does not recall a definitive diagnosis. He has done well ever since. Family history is significant for colon polyps in his father. His mother has undergone 2 colonoscopy examinations. Assessment and plan:     49-year-old man with history of hypertension due for average risk screening colonoscopy examination. I recommended colonoscopy examination with possible biopsy, possible polypectomy. We discussed sedation options of conscious sedation versus MAC anesthesia and decided onconscious sedation. We discussed the nature and risks of colonoscopy examination including sedation, anesthesia risks; bleeding, colonic injury or perforation, infection. The patient understood these risks and agrees to proceed.   The need for an accompanying adult to provide a ride home or escort home was also discussed. Chronic Problems:  Hypertension Nos       ======================  Patient: Yeimi Payor     Date of Exam:  03/11/2015     PROCEDURE:   MRI OF THE ABDOMEN WITH AND WITHOUT CONTRAST     COMPARISON: [facility], CT CORONARY ARTERY CA SCORE,               3/12/2010, 8:49. [facility], [facility]. Stevia First, [address] [place] [address] [facility], 2/11/2015, 7:39. INDICATIONS:  Right kidney mass. Abnormal abdominal ultrasound showing a 26                 x 30 x 26 mm diameter echogenic focus in the mid-lower pole                 of the right kidney. TECHNIQUE:   A comprehensive MRI examination of the abdomen was performed                utilizing a variety of imaging planes and imaging parameters                to optimize visualization of suspected pathology. Images were                obtained both before and after intravenous gadolinium                injection. FINDINGS:   LIVER:             Liver is mildly enlarged measuring 19.0 cm in length. Normal contour. No fatty infiltration. No intrahepatic                       biliary ductal dilatation. No arterial enhancing                       hepatic focus. BILIARY:            MRCP was not performed. The gallbladder is present. No                       intra- or extrahepatic biliary ductal dilatation. PANCREAS:           Normal intrinsic T1 signal, without enhancing lesion,                       atrophy, or ductal dilatation. There is a 12 mm                       diameter nonenhancing focus of susceptibility in the                       pancreatic body which is not clearly seen on delayed                       phases, suggesting it represents an area of prominent                       bowel gas. No acute pancreatitis. SPLEEN:             Normal size. No enhancing lesion.    ADRENALS:           There is a 14 x 19 mm cystic focus in the medial limb                       of the left adrenal gland, which does not appear to                       show significant enhancement on delayed phase imaging. KIDNEYS:            Kidneys have symmetric size. In the mid-lower pole region of the right kidney there                       is a 27 mm diameter soft tissue focus, corresponding                       with abnormality on prior ultrasound. This finding does                       not appear to contain microscopic or macroscopic fat. It has a few tiny intrinsically T1 bright foci, and                       shows fairly homogeneous enhancement without                       significant washout on delayed phases. This finding is                       close to the renal hilum. In the posterior-lateral midpole region cortex of the                       right kidney there is an 8-mm diameter partially                       exophytic enhancing focus. Bilaterally, there is no hydronephrosis. There are T2                       bright cortical based foci both kidneys, some which are                       subcentimeter in size the images were characterize,                       however they all likely represent cysts. OTHER:              No lymphadenopathy in the abdomen. The portal and                       hepatic veins are patent. CONCLUSION:   1. Recent ultrasound demonstrated an echogenic focus within the mid-lower   pole region of the right kidney. The finding corresponds to a 27 mm   diameter enhancing focus on this exam, and its MRI characteristics it is   suspicious for a solid renal tumor. There is also a separate enhancing   subcentimeter focus in the posterolateral aspect of the mid-upper pole of   the right kidney, also suspicious for a solid renal tumor. 2. Left adrenal cyst.   3. Renal cysts. 4. Mild hepatomegaly.        Dictated by (CST):  Roger Costa MD on 3/11/2015 at 11:15     Approved by   (CST):  Roger Costa MD on 3/11/2015 at 11:15                                                              Electronically Verified                                             Estephania Luz  [address] 03/11/2015 11:15     D:   [id number]/03/990588:69 A   T:   03/11/2015 11:15 A   ATTENDING:  Komal Gaytan MD [id number]   ORDERING:  Pia Nowak     DICTATING:   Estephania Luz MD [id number]   MD ID #:    [id number]   cc:  Komal Gaytan MD [id number]     ======================  Micheal  Marycarmen Wall M.D. - 11/07/2016    CT UPPER ABD AND PELVIS W 11/7/2016 11:29 AM    CLINICAL INFORMATION: History of renal cell carcinoma status post nephrectomy, evaluate  for renal recurrence or metastasis    TECHNIQUE: Multiple transaxial CT images of abdomen and pelvis acquired. In addition,  multiple coronal and sagittal images reconstructed from transaxial data. Amount of IV  contrast administered on this exam: 120 mL. COMPARISON: 11/9/2015        FINDINGS:    ABDOMEN:    LUNG BASES: Mild basilar atelectasis/scarring. No pleural effusion. LIVER, BILIARY TRACT: No significant abnormality noted    SPLEEN: No significant abnormality noted    PANCREAS: No significant abnormality noted    ADRENAL GLANDS: Unchanged left adrenal hypodense near water attenuation lesion (measuring  up to 2 cm), favored to be benign. KIDNEYS, URETERS: Status post right-sided nephrectomy. No enhancing soft tissue nodularity  seen at level of right renal bed to suggest local tumor recurrence. Too small to  characterize left renal hypodensities without significant change. New from prior study  mild left-sided hydronephroureter, of uncertain etiology. Paucity of intra-abdominal fat  makes evaluation suboptimal and ureter not well seen along its course, no definite  obstructing stone seen. RETROPERITONEUM, LYMPH NODES: No significant abnormality noted. CALCIFICATIONS IN ABDOMINAL AORTA AND ITS BRANCHES: None.         BOWEL, MESENTERY: Small pelvic free fluid, nonspecific but without significant change from  prior exam.      PELVIS:    PROSTATE, SEMINAL VESICLES: Prostatomegaly. BLADDER: No significant abnormality noted    BONES, SOFT TISSUES: Visualized osseous structures stable in appearance including  scattered punctate sclerotic foci, e.g., in right inferior pubic ramus. Mild spinal  degenerative changes. OTHER: No significant abnormality noted      IMPRESSION:        1. No specific evidence of metastatic disease or local tumor recurrence. 2. New from prior study mild left-sided hydronephroureter, of uncertain etiology. Paucity  of intra-abdominal fat makes evaluation suboptimal and ureter not well seen along its  course, no definite obstructing stone seen. 3. Small pelvic free fluid, nonspecific but without significant change from prior exam.  4. Left adrenal hypodense near water attenuation lesion measuring up to 2 cm without  significant change, benignity favored. Report Electronically Signed: 11/7/2016 12:35 Rylan Manrique MD  Exam End: 11/07/16 11:29 AM   Specimen Collected: 11/07/16 11:39 AM Last Resulted: 11/07/16 12:35 PM  Received From: [address]      ======================  Patient:   Claudell Sport     DATE:  7/15/2013     PROCEDURE:  Colonoscopy. PREOPERATIVE DIAGNOSIS:  Average-risk screening colonoscopy   examination. POSTOPERATIVE DIAGNOSIS:  See Impression. SURGEON:  Gaston Rosario MD     SEDATION:  Fentanyl 100 mcg and midazolam 9 mg given intravenously   in divided doses before and throughout the procedure. DESCRIPTION OF PROCEDURE:  After the nature and risks of   colonoscopy examination under conscious sedation were discussed with Mr. Leighton Bermgan   in the office and today before the case, his informed consent was   obtained. He was sedated as above.   The Olympus pediatric video colonoscope was   placed in the patient's rectum and advanced under direct visualization   through the entire length of the colon up to the cecum and into the terminal   ileum. Cecum was confirmed by landmarks including appendiceal orifice,   cecal strap, ileocecal valve. The quality of the prep was good. Retroflexion was performed in the rectum. COLONOSCOPY FINDINGS:  The cecum, terminal ileum, ascending colon,   hepatic flexure, transverse colon, splenic flexure, descending, sigmoid   and rectum were entirely normal throughout. No neoplasm seen today. Retroflexion in the rectum showed small internal hemorrhoids. The scope was   straightened, air suctioned out, and the scope withdrawn from the patient. He   tolerated the procedure well. IMPRESSION  1. Internal hemorrhoids only on screening colonoscopy examination. RECOMMENDATIONS  1. High-fiber diet. 2. Repeat colonoscopy examination in seven to ten years or as   clinically indicated. D:    07/15/2013 1:53 P  T:    07/15/2013  5:33 P  ATTENDING:  Beck Gillis MD  DICTATING:    Sal Zimmer. Claire Gillis MD MD ID #:      [id number]  cc:   Sal Zimmer. MD Rosalia Rader MD 1273        JOB NUMBER:         [id number]  [facility] #: [id number]. mrt  MR #: [id number]                  Patient: LUCIEN SILVA           Current Outpatient Medications   Medication Sig Dispense Refill    terazosin 2 MG Oral Cap Take 1 capsule (2 mg total) by mouth nightly. 30 capsule 5    Olmesartan Medoxomil 40 MG Oral Tab Take 1 tablet (40 mg total) by mouth daily. Temporary Refill, Call doctor's office, Appointment needed for further refills. 90 tablet 3    amLODIPine 10 MG Oral Tab Take 1 tablet (10 mg total) by mouth daily. 90 tablet 3    metoprolol succinate  MG Oral Tablet 24 Hr Take 1 tablet (100 mg total) by mouth daily. 90 tablet 3    metoprolol succinate ER 50 MG Oral Tablet 24 Hr Take 1 tablet (50 mg total) by mouth daily. 90 tablet 3    cholecalciferol 125 MCG (5000 UT) Oral Tab Take 1 tablet (5,000 Units total) by mouth daily. omega-3 fatty acids 1000 MG Oral Cap Take 1,000 mg by mouth daily. Vitamin E 180 MG (400 UNIT) Oral Cap Take by mouth. psyllium (METAMUCIL) 28 % Oral Powd Pack Take 1 packet by mouth 2 (two) times daily. Digital Therapy (HELLO HEART BLOOD PRESSURE) Does not apply Kit daily. Multiple Vitamins-Minerals (MULTIVITAMIN ADULT OR) Take by mouth. Allergies:  Lisinopril-Hydrochl*        Comment:Other reaction(s): Other             Severe leg cramps and excessive urination             Severe leg cramps  Wheat Gluten            DIARRHEA, NAUSEA AND VOMITING  Wheat Germ              OTHER (SEE COMMENTS)  Imaging: US BIOPSY PROSTATE URONAV (CPT=55700/12755/01509)    Result Date: 9/16/2023  PROCEDURE: US BIOPSY PROSTATE URONAV (CPT=55700/10694/41202)  COMPARISON: [facility], MRI PROSTATE(W+WO) (CPT=72197), 8/10/2023, 6:08 PM.  INDICATIONS: Elevated PSA. TECHNIQUE: MR images were imported with creation of a 3D volumetric representation of the prostate gland and delineation of target lesion(s) of interest. Fusion of the MR images and sonographic images acquired by the transrectal probe was performed with elastic deformation technique. The urologist performed multiple core biopsies through the target lesion(s) of interest, as well as non-targeted representative sample biopsies throughout the remainder of the prostate gland. FINDINGS:  PROSTATE: The prostate measures 5.9 x 6.1 x 4.2 cm for a calculated volume of 80 cc. OTHER: No free fluid is seen in the pelvis. CONCLUSION:  Technically successful MR-ultrasound fusion targeted biopsy was performed. Please see the separately dictated urologist's procedure note for further details.     Dictated by (CST): Jonny Cole MD on 9/16/2023 at 5:46 AM     Finalized by (CST): Jonny Cole MD on 9/16/2023 at 5:46 AM               PHYSICAL EXAM:   Physical Exam           ASSESSMENT/PLAN:     61year old man with history hypertension, dyslipidemia who I saw previously 10 years ago for average risk screening colonoscopy examination. 7 years ago, work-up for abdominal symptoms led to an abdominal ultrasound which showed incidental finding of a kidney lesion. This turned out to be a suspected malignancy and Mr. Reyez underwent a robotic nephrectomy surgery [facility] April 2015. Sounds like it went very well. Before after that, Mr. Gina Agarwal was suffering a syndrome of vomiting, oily greasy stools and abnormal weight loss. It sounds like the weight loss was significant. This got eclipsed by the work-up of the kidney lesion. His wife researched his symptoms and advised him to try going gluten-free. Mr. Gina Agarwal went gluten-free as well as going off all possible GMO foods and recovered. The vomiting, greasy oily stools resolved and he has gained weight. Very attentive to hydration and avoiding any nephrotoxins since the nephrectomy surgery. Comes in today looking remarkably strong and healthy. Recent reassuring prostate biopsy with Dr. Juan C Quesada. Suggest:    Subjective gluten intolerance  Significant illness approximately 2015 of vomiting, greasy oily stools and abnormal weight loss as above which responded to going gluten-free, avoiding GMO  Celiac testing was discussed and deferred. He would be low probability; more importantly Mr. Reyez is very satisfied with the effects of the gluten-free diet and intends to continue gluten-free  No recent abdominal imaging since follow-up for the kidney lesion 6 years ago. Call, follow-up with us as needed    2. Colon cancer screening, average risk  Due for next screening colonoscopy examination    I recommended and discussed screening colonoscopy examination. Alternatives including stool testing, imaging briefly discussed. Consent:    I recommended colonoscopy examination with possible biopsy, possible polypectomy under MAC anesthesia.   Somewhat poor experience, very prolonged sedation after the exam 10 years ago with high-dose conscious sedation medications. We discussed the nature and risks of colonoscopy examination including sedation, anesthesia risks; bleeding, colonic injury or perforation, infection. We discussed the rare occurrence of missed lesions including missed polyps or missed malignancy with colonoscopy examination. The patient understood these risks and agrees to proceed. The need for an accompanying adult to provide a ride home or escort home was also discussed. GoLYTELY bowel prep          Over 30 minutes spent today discussing the above and counseling this patient, reviewing chart notes and data and composing this note. Digital transcription software was utilized to produce this note. The note was proofread for content only. Typographical errors may remain.        Meds This Visit:  Requested Prescriptions      No prescriptions requested or ordered in this encounter       Imaging & Referrals:  None       AQ#[id number]

## 2023-10-11 NOTE — TELEPHONE ENCOUNTER
Scheduled for:  Colonoscopy [address]  Provider Name:  Dr Izabela Carolina  Date:  01/24/2024  Location:  [facility]  Sedation:  MAC  Time:  2:15 (pt is aware that [facility] will call the day before to confirm arrival time)    Prep:  Colyte  Meds/Allergies Reconciled?:  Physician reviewed  Diagnosis with codes:  CCS Z12.11  Was patient informed to call insurance with codes (Y/N):       Referral sent?:  Referral was sent at the time of electronic surgical scheduling. [address] [place] [address] or [facility] notified?:  I sent an electronic request to Endo Scheduling and received a confirmation today. Medication Orders:  Pt is aware to NOT take iron pills, herbal meds and diet supplements for 7 days before exam. Also to NOT take any form of alcohol, recreational drugs and any forms of ED meds 24 hours before exam.     Misc Orders:       Further instructions given by staff:  I discussed the prep intructions with the patient in office which he verbally understood. Copy of instructions was handed to patient as well. Patient was also advised about cancellation policy.

## 2024-01-17 RX ORDER — AMLODIPINE BESYLATE 10 MG/1
10 TABLET ORAL DAILY
Qty: 90 TABLET | Refills: 3 | OUTPATIENT
Start: 2024-01-17

## 2024-02-29 NOTE — TELEPHONE ENCOUNTER
Please review.  Protocol failed / No protocol.    Requested Prescriptions   Pending Prescriptions Disp Refills    metoprolol succinate  MG Oral Tablet 24 Hr [Pharmacy Med Name: METOPROLOL SUCC  MG TAB] 90 tablet 3     Sig: Take 1 tablet (100 mg total) by mouth daily. Along with 1 tablet (50mg) to equal 150mg daily dose       Hypertension Medications Protocol Failed - 2/28/2024 12:38 PM        Failed - CMP or BMP in past 12 months        Failed - Last BP reading less than 140/90     BP Readings from Last 1 Encounters:   10/11/23 152/81               Failed - EGFRCR or GFRAA > 50     GFR Evaluation            Passed - In person appointment or virtual visit in the past 12 mos or appointment in next 3 mos     Recent Outpatient Visits              4 months ago Change in bowel habits    [facility] Colton Bailey MD    Office Visit    5 months ago Elevated PSA    [facility] Brian Dee MD    Office Visit    5 months ago Essential hypertension with goal blood pressure less than 140/90    [facility] [place] Petey Oquendo MD    Office Visit    7 months ago Essential hypertension with goal blood pressure less than 140/90    [facility] [place] Petey Oquendo MD    Office Visit    7 months ago Elevated PSA    [facility] Brian Dee MD    Office Visit          Future Appointments         Provider Department Appt Notes    In 2 weeks Petey Oquendo MD [facility] Annual physical and order for fasting lab work    In 3 weeks Brian Dee MD [facility] 6 month    In 2 months ARASH BAILEY [facility] colon w/mac @[facility]                     Recent Outpatient Visits               4 months ago Change in bowel habits    [facility] Colton Bailey MD    Office Visit    5 months ago Elevated PSA    [facility] Brian Dee MD    Office Visit    5 months ago Essential hypertension with goal blood pressure less than 140/90    [facility] Petey Oquendo MD    Office Visit    7 months ago Essential hypertension with goal blood pressure less than 140/90    [facility] Petey Oquendo MD    Office Visit    7 months ago Elevated PSA    [facility] Brian Dee MD    Office Visit             Future Appointments         Provider Department Appt Notes    In 2 weeks Petey Oquendo MD [facility] Annual physical and order for fasting lab work    In 3 weeks Brian Dee MD [facility] 6 month    In 2 months ARASH BAILEY [facility] colon w/mac @[facility]

## 2024-02-29 NOTE — TELEPHONE ENCOUNTER
Please review; protocol failed/No Protocol    LOV: 09/22/2023    No Active/Future labs pended       Requested Prescriptions   Pending Prescriptions Disp Refills    AMLODIPINE 10 MG Oral Tab [Pharmacy Med Name: AMLODIPINE BESYLATE 10 MG TAB] 90 tablet 3     Sig: TAKE 1 TABLET BY MOUTH EVERY DAY       Hypertension Medications Protocol Failed - 2/28/2024 12:38 PM        Failed - CMP or BMP in past 12 months        Failed - Last BP reading less than 140/90     BP Readings from Last 1 Encounters:   10/11/23 152/81               Failed - EGFRCR or GFRAA > 50     GFR Evaluation            Passed - In person appointment or virtual visit in the past 12 mos or appointment in next 3 mos     Recent Outpatient Visits              4 months ago Change in bowel habits    [facility] Colton Bailey MD    Office Visit    5 months ago Elevated PSA    [facility] Brian Dee MD    Office Visit    5 months ago Essential hypertension with goal blood pressure less than 140/90    [facility] Petey Oquendo MD    Office Visit    7 months ago Essential hypertension with goal blood pressure less than 140/90    [facility] Petey Oquendo MD    Office Visit    7 months ago Elevated PSA    [facility] Brian Dee MD    Office Visit          Future Appointments         Provider Department Appt Notes    In 2 weeks Petey Oquendo MD [facility] Annual physical and order for fasting lab work    In 3 weeks Brian Dee MD [facility] 6 month    In 2 months ARASH BAILEY [facility] colon w/mac @[facility]                 METOPROLOL SUCCINATE ER 50 MG Oral Tablet 24 Hr [Pharmacy Med Name:  METOPROLOL SUCC ER 50 MG TAB] 90 tablet 3     Sig: TAKE 1 TABLET BY MOUTH EVERY DAY       Hypertension Medications Protocol Failed - 2/28/2024 12:38 PM        Failed - CMP or BMP in past 12 months        Failed - Last BP reading less than 140/90     BP Readings from Last 1 Encounters:   10/11/23 152/81               Failed - EGFRCR or GFRAA > 50     GFR Evaluation            Passed - In person appointment or virtual visit in the past 12 mos or appointment in next 3 mos     Recent Outpatient Visits              4 months ago Change in bowel habits    [facility] Colton Bailey MD    Office Visit    5 months ago Elevated PSA    [facility] Brian Dee MD    Office Visit    5 months ago Essential hypertension with goal blood pressure less than 140/90    [facility] Petey Oquendo MD    Office Visit    7 months ago Essential hypertension with goal blood pressure less than 140/90    [facility] Petey Oquendo MD    Office Visit    7 months ago Elevated PSA    [facility] Brian Dee MD    Office Visit          Future Appointments         Provider Department Appt Notes    In 2 weeks Petey Oquendo MD [facility] Annual physical and order for fasting lab work    In 3 weeks Brian Dee MD [facility] 6 month    In 2 months ARASH BAILEY [facility] colon w/mac @[facility]                  Future Appointments         Provider Department Appt Notes    In 2 weeks Petey Oquendo MD [facility] Annual physical and order for fasting lab work    In 3 weeks Brian Dee MD [facility]  6 month    In 2 months CONG, PROCEDURE [facility], [place] colon w/mac @[facility]          Recent Outpatient Visits              4 months ago Change in bowel habits    [facility] [place]    Office Visit    5 months ago Elevated PSA    [facility] Brian Zaldivar MD    Office Visit    5 months ago Essential hypertension with goal blood pressure less than 140/90    [facility] Petey Huang MD    Office Visit    7 months ago Essential hypertension with goal blood pressure less than 140/90    [facility] Petey Huang MD    Office Visit    7 months ago Elevated PSA    [facility] Brian Zaldivar MD    Office Visit

## 2024-02-29 NOTE — TELEPHONE ENCOUNTER
Please review; protocol failed/No Protocol    LOV: 09/22/2023    No Active/Future labs pended    Requested Prescriptions   Pending Prescriptions Disp Refills    METOPROLOL SUCCINATE  MG Oral Tablet 24 Hr [Pharmacy Med Name: METOPROLOL SUCC  MG TAB] 90 tablet 3     Sig: TAKE 1 TABLET BY MOUTH EVERY DAY       Hypertension Medications Protocol Failed - 2/28/2024 12:38 PM        Failed - CMP or BMP in past 12 months        Failed - Last BP reading less than 140/90     BP Readings from Last 1 Encounters:   10/11/23 152/81               Failed - EGFRCR or GFRAA > 50     GFR Evaluation            Passed - In person appointment or virtual visit in the past 12 mos or appointment in next 3 mos     Recent Outpatient Visits              4 months ago Change in bowel habits    [place] Colton Bailey MD    Office Visit    5 months ago Elevated PSA    [place] Brian Dee MD    Office Visit    5 months ago Essential hypertension with goal blood pressure less than 140/90    [facility]    Office Visit    7 months ago Essential hypertension with goal blood pressure less than 140/90    [facility] Petey Oquendo MD    Office Visit    7 months ago Elevated PSA    [facility]    Office Visit          Future Appointments         Provider Department Appt Notes    In 2 weeks Petey Oquendo MD [facility] Annual physical and order for fasting lab work    In 3 weeks Brian Dee MD [place] 6 month    In 2 months ARASH BAILEY [place] colon w/mac @[facility]                  Future Appointments         Provider Department  Appt Notes    In 2 weeks Petey Oquendo MD [facility] Annual physical and order for fasting lab work    In 3 weeks Brian Dee MD [facility] 6 month    In 2 months ARASH BAILEY [facility] colon w/mac @[facility]          Recent Outpatient Visits              4 months ago Change in bowel habits    [facility] Colton Bailey MD    Office Visit    5 months ago Elevated PSA    [facility] Brian Dee MD    Office Visit    5 months ago Essential hypertension with goal blood pressure less than 140/90    [facility] Petey Oquendo MD    Office Visit    7 months ago Essential hypertension with goal blood pressure less than 140/90    [facility] Petey Oquendo MD    Office Visit    7 months ago Elevated PSA    [facility] Brian Dee MD    Office Visit

## 2024-03-01 RX ORDER — METOPROLOL SUCCINATE 100 MG/1
100 TABLET, EXTENDED RELEASE ORAL DAILY
Qty: 90 TABLET | Refills: 3 | Status: SHIPPED | OUTPATIENT
Start: 2024-03-01

## 2024-03-01 RX ORDER — AMLODIPINE BESYLATE 10 MG/1
10 TABLET ORAL DAILY
Qty: 90 TABLET | Refills: 3 | Status: SHIPPED | OUTPATIENT
Start: 2024-03-01

## 2024-03-01 RX ORDER — METOPROLOL SUCCINATE 50 MG/1
50 TABLET, EXTENDED RELEASE ORAL DAILY
Qty: 90 TABLET | Refills: 3 | Status: SHIPPED | OUTPATIENT
Start: 2024-03-01

## 2024-03-11 RX ORDER — TERAZOSIN 2 MG/1
2 CAPSULE ORAL NIGHTLY
Qty: 90 CAPSULE | Refills: 3 | Status: SHIPPED | OUTPATIENT
Start: 2024-03-11

## 2024-03-11 NOTE — TELEPHONE ENCOUNTER
Please review; protocol failed/No Protocol    LOV: 09/22/2023  Future Appointments   Date Time Provider Department Center   3/20/2024  9:40 AM [facility] Active/Future labs pended     Requested Prescriptions   Pending Prescriptions Disp Refills    TERAZOSIN 2 MG Oral Cap [Pharmacy Med Name: TERAZOSIN 2 MG CAPSULE] 30 capsule 5     Sig: TAKE 1 CAPSULE BY MOUTH EVERY DAY AT NIGHT       Hypertension Medications Protocol Failed - 3/10/2024 12:38 AM        Failed - CMP or BMP in past 12 months        Failed - Last BP reading less than 140/90     BP Readings from Last 1 Encounters:   10/11/23 152/81               Failed - EGFRCR or GFRAA > 50     GFR Evaluation            Passed - In person appointment or virtual visit in the past 12 mos or appointment in next 3 mos     Recent Outpatient Visits              5 months ago Change in bowel habits    [facility] Colton Bailey MD    Office Visit    5 months ago Elevated PSA    [facility] Brian Dee MD    Office Visit    5 months ago Essential hypertension with goal blood pressure less than 140/90    [facility] Petey Oquendo MD    Office Visit    7 months ago Essential hypertension with goal blood pressure less than 140/90    [facility]    Office Visit    8 months ago Elevated PSA    [facility] Brian Dee MD    Office Visit          Future Appointments         Provider Department Appt Notes    In 1 week Petey Oquendo MD [facility] Annual physical and order for fasting lab work    In 2 weeks Brian Dee MD [facility] 6 month    In 2 months ARASH BAILEY [facility]  [facility] colon w/mac @[facility]                  Future Appointments         Provider Department Appt Notes    In 1 week Petey Oquendo MD [facility] Annual physical and order for fasting lab work    In 2 weeks Brian Dee MD [facility] 6 month    In 2 months CONG, ARASH [facility] colon w/mac @[facility]          Recent Outpatient Visits              5 months ago Change in bowel habits    [facility] Colton Bailey MD    Office Visit    5 months ago Elevated PSA    [facility] Brian Dee MD    Office Visit    5 months ago Essential hypertension with goal blood pressure less than 140/90    [facility] Petey Oquendo MD    Office Visit    7 months ago Essential hypertension with goal blood pressure less than 140/90    [facility]    [facility]    8 months ago Elevated PSA    [facility]    Office Visit

## 2024-03-15 ENCOUNTER — LAB ENCOUNTER (OUTPATIENT)
Dept: LAB | Facility: HOSPITAL | Age: 64
End: 2024-03-15
Attending: INTERNAL MEDICINE
Payer: COMMERCIAL

## 2024-03-15 ENCOUNTER — TELEPHONE (OUTPATIENT)
Dept: INTERNAL MEDICINE CLINIC | Facility: CLINIC | Age: 64
End: 2024-03-15

## 2024-03-15 DIAGNOSIS — R97.20 ELEVATED PSA: ICD-10-CM

## 2024-03-15 DIAGNOSIS — I10 ESSENTIAL HYPERTENSION WITH GOAL BLOOD PRESSURE LESS THAN 140/90: Primary | ICD-10-CM

## 2024-03-15 DIAGNOSIS — I10 HTN (HYPERTENSION): Primary | ICD-10-CM

## 2024-03-15 LAB
ALBUMIN SERPL-MCNC: 4.1 G/DL (ref 3.2–4.8)
ALBUMIN/GLOB SERPL: 1.4 {RATIO} (ref 1–2)
ALP LIVER SERPL-CCNC: 69 U/L
ALT SERPL-CCNC: 16 U/L
ANION GAP SERPL CALC-SCNC: 7 MMOL/L (ref 0–18)
BASOPHILS # BLD AUTO: 0.01 X10(3) UL (ref 0–0.2)
BASOPHILS NFR BLD AUTO: 0.2 %
BILIRUB SERPL-MCNC: 1.5 MG/DL (ref 0.2–1.1)
CALCIUM BLD-MCNC: 9.8 MG/DL (ref 8.7–10.4)
CHLORIDE SERPL-SCNC: 110 MMOL/L (ref 98–112)
CHOLEST SERPL-MCNC: 174 MG/DL (ref ?–200)
CO2 SERPL-SCNC: 23 MMOL/L (ref 21–32)
CREAT BLD-MCNC: 1.32 MG/DL
DEPRECATED RDW RBC AUTO: 44.8 FL (ref 35.1–46.3)
EGFRCR SERPLBLD CKD-EPI 2021: 60 ML/MIN/1.73M2 (ref 60–?)
EOSINOPHIL # BLD AUTO: 0.02 X10(3) UL (ref 0–0.7)
EOSINOPHIL NFR BLD AUTO: 0.4 %
ERYTHROCYTE [DISTWIDTH] IN BLOOD BY AUTOMATED COUNT: 13.5 % (ref 11–15)
FASTING PATIENT LIPID ANSWER: YES
FASTING STATUS PATIENT QL REPORTED: YES
GLOBULIN PLAS-MCNC: 2.9 G/DL (ref 2.8–4.4)
GLUCOSE BLD-MCNC: 89 MG/DL (ref 70–99)
HCT VFR BLD AUTO: 41.6 %
HDLC SERPL-MCNC: 72 MG/DL (ref 40–59)
HGB BLD-MCNC: 14.4 G/DL
IMM GRANULOCYTES # BLD AUTO: 0.01 X10(3) UL (ref 0–1)
IMM GRANULOCYTES NFR BLD: 0.2 %
LDLC SERPL CALC-MCNC: 88 MG/DL (ref ?–100)
LYMPHOCYTES # BLD AUTO: 1.77 X10(3) UL (ref 1–4)
LYMPHOCYTES NFR BLD AUTO: 36.2 %
MCH RBC QN AUTO: 31.4 PG (ref 26–34)
MCHC RBC AUTO-ENTMCNC: 34.6 G/DL (ref 31–37)
MCV RBC AUTO: 90.6 FL
MONOCYTES # BLD AUTO: 0.49 X10(3) UL (ref 0.1–1)
MONOCYTES NFR BLD AUTO: 10 %
NEUTROPHILS # BLD AUTO: 2.59 X10 (3) UL (ref 1.5–7.7)
NEUTROPHILS # BLD AUTO: 2.59 X10(3) UL (ref 1.5–7.7)
NEUTROPHILS NFR BLD AUTO: 53 %
NONHDLC SERPL-MCNC: 102 MG/DL (ref ?–130)
PLATELET # BLD AUTO: 364 10(3)UL (ref 150–450)
PROT SERPL-MCNC: 7 G/DL (ref 5.7–8.2)
PSA SERPL-MCNC: 1.57 NG/ML (ref ?–4)
RBC # BLD AUTO: 4.59 X10(6)UL
SODIUM SERPL-SCNC: 140 MMOL/L (ref 136–145)
TRIGL SERPL-MCNC: 74 MG/DL (ref 30–149)
TSI SER-ACNC: 0.63 MIU/ML (ref 0.55–4.78)
VLDLC SERPL CALC-MCNC: 12 MG/DL (ref 0–30)
WBC # BLD AUTO: 4.9 X10(3) UL (ref 4–11)

## 2024-03-15 PROCEDURE — 80053 COMPREHEN METABOLIC PANEL: CPT

## 2024-03-15 PROCEDURE — 36415 COLL VENOUS BLD VENIPUNCTURE: CPT

## 2024-03-15 PROCEDURE — 80061 LIPID PANEL: CPT

## 2024-03-15 PROCEDURE — 84443 ASSAY THYROID STIM HORMONE: CPT

## 2024-03-15 PROCEDURE — 84153 ASSAY OF PSA TOTAL: CPT

## 2024-03-15 PROCEDURE — 85025 COMPLETE CBC W/AUTO DIFF WBC: CPT

## 2024-03-20 ENCOUNTER — OFFICE VISIT (OUTPATIENT)
Dept: INTERNAL MEDICINE CLINIC | Facility: CLINIC | Age: 64
End: 2024-03-20

## 2024-03-20 VITALS
SYSTOLIC BLOOD PRESSURE: 136 MMHG | HEART RATE: 76 BPM | BODY MASS INDEX: 28.44 KG/M2 | RESPIRATION RATE: 18 BRPM | TEMPERATURE: 98 F | HEIGHT: 69 IN | DIASTOLIC BLOOD PRESSURE: 72 MMHG | WEIGHT: 192 LBS

## 2024-03-20 DIAGNOSIS — M10.9 GOUT, UNSPECIFIED CAUSE, UNSPECIFIED CHRONICITY, UNSPECIFIED SITE: ICD-10-CM

## 2024-03-20 DIAGNOSIS — N40.0 BENIGN PROSTATIC HYPERPLASIA, UNSPECIFIED WHETHER LOWER URINARY TRACT SYMPTOMS PRESENT: ICD-10-CM

## 2024-03-20 DIAGNOSIS — N18.9 CHRONIC KIDNEY DISEASE, UNSPECIFIED CKD STAGE: ICD-10-CM

## 2024-03-20 DIAGNOSIS — Z85.528 HISTORY OF KIDNEY CANCER: ICD-10-CM

## 2024-03-20 DIAGNOSIS — I10 ESSENTIAL HYPERTENSION WITH GOAL BLOOD PRESSURE LESS THAN 140/90: ICD-10-CM

## 2024-03-20 DIAGNOSIS — Z00.00 ROUTINE PHYSICAL EXAMINATION: Primary | ICD-10-CM

## 2024-03-20 DIAGNOSIS — R97.20 ELEVATED PSA: ICD-10-CM

## 2024-03-20 PROCEDURE — 3075F SYST BP GE 130 - 139MM HG: CPT | Performed by: INTERNAL MEDICINE

## 2024-03-20 PROCEDURE — 99396 PREV VISIT EST AGE 40-64: CPT | Performed by: INTERNAL MEDICINE

## 2024-03-20 PROCEDURE — 3078F DIAST BP <80 MM HG: CPT | Performed by: INTERNAL MEDICINE

## 2024-03-20 PROCEDURE — 3008F BODY MASS INDEX DOCD: CPT | Performed by: INTERNAL MEDICINE

## 2024-03-20 RX ORDER — TERAZOSIN 5 MG/1
5 CAPSULE ORAL NIGHTLY
Qty: 90 CAPSULE | Refills: 1 | Status: SHIPPED | OUTPATIENT
Start: 2024-03-20

## 2024-03-20 NOTE — PROGRESS NOTES
HPI:    Patient ID: Hima Reyez is a 64 year old male.    HPI  Patient is here requesting a physical exam and follow-up on chronic medical issues as listed below.  Last seen in the office in September of last year.  At that time we switched Flomax over to terazosin 2 mg a day noted to try to better control his blood pressure.  Blood pressure was borderline at that time.  Since that time he has seen urology and GI.  Current medications reviewed.  Health maintenance and vaccination status reviewed.  Full blood work done about 5 days ago.  Everything looked normal.  PSA had gone down.     No major issues. Had Covid in January; resolved pretty quickly; had a lot of sinus and runny nose; also a fever; no residual. Patient does check blood pressure at home. It has been running around 130's/80's. No side effects with the meds. Diet is still pretty healthy. Exercise routine was jumbled up due to issues with his dog and the weather and the Covid. Weight is up a few pounds. No tobacco. EtOH is less. Using THC daily but less amount now. The colonoscopy was rescheduled in Jan due to COVID; will be on May.       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    BPH (benign prostatic hyperplasia)    Nocturia          HISTORY:  Past Medical History:   Diagnosis Date    Back problem     lumbar microdiscectomy    BPH (benign prostatic hyperplasia)     Cardiovascular disorder 2010    Cardiac calcium scan- normal    Celiac disease (HCC)     Disc displacement 2010    Microdiscectomy L4-5; good outcome, Completed at [facility]    High blood pressure     High cholesterol     Hypertension     Knee injuries     bilateral, arthroscopy, detached patella Rt /Lt Football injury    Leg cramps 2013    Severe leg cramps with lisinopril-HCTZ    Osteoarthritis     Renal cell carcinoma (HCC) April 23, 2015    Right kidney removed    Tonsillitis 1993    tonsillectomy    Visual  impairment     reading glasses      Past Surgical History:   Procedure Laterality Date    COLONOSCOPY      KNEE ARTHROSCOPY Bilateral     knee injuries    LAPAROSCOPY, SURGICAL; NEPHRECTOMY Right April 2015    Robotic surgery at [facility]    OTHER SURGICAL HISTORY      Microdiscectomy L4-5; good outcome, Completed at [facility] (Disc displacement)    RECTUM SURGERY PROCEDURE UNLISTED  1994    repair of rectal tear    SPINE SURGERY PROCEDURE UNLISTED      lumbar microdiscectomy    TONSILLECTOMY      tonstillitis 1993    UPPER GI ENDOSCOPY,EXAM        Family History   Problem Relation Age of Onset    Cancer Father         salivary and kidney    Hypertension Mother     Arthritis Mother         gout      Social History     Socioeconomic History    Marital status:     Number of children: 2   Occupational History    Occupation:  /      Employer: "[employer]   Tobacco Use    Smoking status: Former     Types: Cigars    Smokeless tobacco: Never    Tobacco comments:     tobacco on and off for 20 years   Vaping Use    Vaping Use: Never used   Substance and Sexual Activity    Alcohol use: Yes     Comment: 4 times a week    Drug use: Yes     Types: Cannabis     Comment: daily    Sexual activity: Not Currently     Partners: Female   Other Topics Concern    Caffeine Concern Yes     Comment: coffee, chocolate, 1 cup daily          Review of Systems          Current Outpatient Medications   Medication Sig Dispense Refill    terazosin 2 MG Oral Cap Take 1 capsule (2 mg total) by mouth nightly. 90 capsule 3    amLODIPine 10 MG Oral Tab Take 1 tablet (10 mg total) by mouth daily. 90 tablet 3    metoprolol succinate  MG Oral Tablet 24 Hr Take 1 tablet (100 mg total) by mouth daily. Along with 1 tablet (50mg) to equal 150mg daily dose 90 tablet 3    metoprolol succinate ER 50 MG Oral Tablet 24 Hr Take 1 tablet (50 mg total) by mouth daily. Along with 1 tablet (100mg) to equal 150mg  daily dose. 90 tablet 3    Olmesartan Medoxomil 40 MG Oral Tab Take 1 tablet (40 mg total) by mouth daily. Temporary Refill, Call doctor's office, Appointment needed for further refills. 90 tablet 3    cholecalciferol 125 MCG (5000 UT) Oral Tab Take 1 tablet (5,000 Units total) by mouth daily.      omega-3 fatty acids 1000 MG Oral Cap Take 1,000 mg by mouth daily.      Vitamin E 180 MG (400 UNIT) Oral Cap Take by mouth.      psyllium (METAMUCIL) 28 % Oral Powd Pack Take 1 packet by mouth 2 (two) times daily.      Digital Therapy (HELLO HEART BLOOD PRESSURE) Does not apply Kit daily.      Multiple Vitamins-Minerals (MULTIVITAMIN ADULT OR) Take by mouth.       Allergies:  Allergies   Allergen Reactions    Lisinopril-Hydrochlorothiazide      Other reaction(s): Other  Severe leg cramps and excessive urination   Severe leg cramps    Wheat Gluten DIARRHEA and NAUSEA AND VOMITING    Wheat Germ OTHER (SEE COMMENTS)        PHYSICAL EXAM:   /72 (BP Location: Left arm, Patient Position: Sitting, Cuff Size: large)   Pulse 76   Temp 97.8 °F (36.6 °C) (Other)   Resp 18   Ht 5' 9\" (1.753 m)   Wt 192 lb (87.1 kg)   BMI 28.35 kg/m²      Physical Exam  Constitutional:       Appearance: Normal appearance. He is well-developed.   HENT:      Right Ear: Tympanic membrane and ear canal normal.      Left Ear: Tympanic membrane and ear canal normal.      Nose: Nose normal.      Mouth/Throat:      Pharynx: No oropharyngeal exudate or posterior oropharyngeal erythema.   Eyes:      Conjunctiva/sclera: Conjunctivae normal.      Pupils: Pupils are equal, round, and reactive to light.   Neck:      Thyroid: No thyromegaly.      Vascular: No carotid bruit.   Cardiovascular:      Rate and Rhythm: Normal rate and regular rhythm.      Pulses: Normal pulses.      Heart sounds: Normal heart sounds. No murmur heard.  Pulmonary:      Effort: Pulmonary effort is normal.      Breath sounds: Normal breath sounds. No wheezing or rales.    Abdominal:      General: Bowel sounds are normal.      Palpations: Abdomen is soft. There is no mass.      Tenderness: There is no abdominal tenderness.      Hernia: There is no hernia in the left inguinal area.   Genitourinary:     Penis: Normal and uncircumcised.       Testes: Normal.   Musculoskeletal:      Right lower leg: No edema.      Left lower leg: No edema.   Lymphadenopathy:      Cervical: No cervical adenopathy.   Skin:     General: Skin is warm and dry.      Findings: No rash.      Comments: Lesion in the mons pubis area   Neurological:      General: No focal deficit present.      Mental Status: He is alert.      Cranial Nerves: No cranial nerve deficit.      Coordination: Coordination normal.   Psychiatric:         Mood and Affect: Mood normal.         Behavior: Behavior normal.         Thought Content: Thought content normal.         Judgment: Judgment normal.          Wt Readings from Last 6 Encounters:   03/20/24 192 lb (87.1 kg)   01/18/24 185 lb (83.9 kg)   10/11/23 188 lb (85.3 kg)   09/22/23 185 lb (83.9 kg)   09/15/23 190 lb (86.2 kg)   07/21/23 189 lb (85.7 kg)             ASSESSMENT/PLAN:   1. Routine physical examination  Physical exam is unremarkable other than blood pressure issue.  His weight is up a few pounds.  Encouraged to get back to more regular exercise routine.  Maintain healthy diet.  Try to drop a few pounds he has gained.  Recent blood work reviewed.  Health maintenance issues reviewed.  He will be getting his colonoscopy later in the year.    2. Essential hypertension with goal blood pressure less than 140/90  Borderline controlled at home.  More elevated in the office when checked by physician when it was 158/74.  Increase dose of terazosin from 2 mg up to 5 mg.  Follow-up in 3 months to recheck blood pressure.    3. Benign prostatic hyperplasia, unspecified whether lower urinary tract symptoms present  Doing well.  PSA has decreased since switching from the tamsulosin over  the terazosin.  Following up with urology soon.  Increased dose of terazosin from 2 mg up to 5 mg as stated above for better blood pressure control.    4. Elevated PSA  It is now back down to normal.  Follow-up with urology.    5. Gout, unspecified cause, unspecified chronicity, unspecified site  No recent episodes.  Maintain adequate fluid intake.    6. History of kidney cancer  No evidence of recurrence.  Follow-up with urology.    7. Chronic kidney disease, unspecified CKD stage  GFR now at 60.  This is good news.  Some of the better than what it was last year.  We will work to control blood pressure.  He is to maintain adequate fluid intake.         Meds This Visit:  Requested Prescriptions      No prescriptions requested or ordered in this encounter       Imaging & Referrals:  None         Petey Oquendo MD

## 2024-03-25 ENCOUNTER — OFFICE VISIT (OUTPATIENT)
Dept: SURGERY | Facility: CLINIC | Age: 64
End: 2024-03-25
Payer: COMMERCIAL

## 2024-03-25 DIAGNOSIS — N40.1 BPH WITH OBSTRUCTION/LOWER URINARY TRACT SYMPTOMS: ICD-10-CM

## 2024-03-25 DIAGNOSIS — C64.1 MALIGNANT NEOPLASM OF RIGHT KIDNEY (HCC): ICD-10-CM

## 2024-03-25 DIAGNOSIS — N13.8 BPH WITH OBSTRUCTION/LOWER URINARY TRACT SYMPTOMS: ICD-10-CM

## 2024-03-25 DIAGNOSIS — R97.20 ELEVATED PSA: Primary | ICD-10-CM

## 2024-03-25 PROCEDURE — 99214 OFFICE O/P EST MOD 30 MIN: CPT | Performed by: UROLOGY

## 2024-03-25 RX ORDER — PODOFILOX 5 MG/ML
1 SOLUTION TOPICAL 2 TIMES DAILY
Qty: 3.5 ML | Refills: 1 | Status: SHIPPED | OUTPATIENT
Start: 2024-03-25

## 2024-03-25 NOTE — PROGRESS NOTES
iHma Reyez is a 64 year old male.    HPI:     Chief Complaint   Patient presents with    Follow - Up     6 month follow up, pt taking terazosin he says it helps with his prostate symptoms.        64-year-old male in follow-up to previous visits September 25, 2023.    He sees me for BPH, lower urinary tract symptoms and elevated serum PSA.  Remains on terazosin 5 mg daily.  His primary care physician is titrating the dose up to also help with his blood pressure.  Urination symptoms appear to be well-controlled.  IPSS score is 10 quality-of-life index is 3.  Denies any gross hematuria or dysuria.    Has a history of elevated PSA.  Had a MRI of the prostate last year that demonstrated PI-RADS 3 classification.  No known family history of prostate cancers.  Digital prostate exams have demonstrated BPH.  He did undergo a transrectal ultrasound prostate biopsy, MRI ultrasound fusion September 2023 that was negative for cancer but did demonstrate a focus of atypical small acinar proliferation.  Repeat PSA performed March 15, 2024 1.57.  I reviewed these results with the patient today.    He has a history of renal cell carcinoma.  In 2015 he underwent robotic assisted laparoscopic partial nephrectomy that was converted to a radical nephrectomy at the [facility] (Dr. Cole).  Pathology demonstrated:    FINAL PATHOLOGIC DIAGNOSIS  A. 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  - Tumors confined to the kidney.  - Margins of resection (soft tissue, ureter, renal artery and vein), no tumor  present.  - Renal cortical cysts.  - See pathologic parameters and comment.     Had a CT of the abdomen and pelvis at the [facility] postoperatively November 2015 that demonstrated no recurrence.  Renal bladder ultrasound June 2017 demonstrated a grossly  unremarkable left kidney.  Urinalysis August 2022 unremarkable.  HISTORY:  Past Medical History:   Diagnosis Date    Back problem     lumbar microdiscectomy    BPH (benign prostatic hyperplasia)     Cardiovascular disorder 2010    Cardiac calcium scan- normal    Celiac disease (HCC)     Disc displacement 2010    Microdiscectomy L4-5; good outcome, Completed at [facility]    High blood pressure     High cholesterol     Hypertension     Knee injuries     bilateral, arthroscopy, detached patella Rt /Lt Football injury    Leg cramps 2013    Severe leg cramps with lisinopril-HCTZ    Osteoarthritis     Renal cell carcinoma (HCC) April 23, 2015    Right kidney removed    Tonsillitis 1993    tonsillectomy    Visual impairment     reading glasses      Past Surgical History:   Procedure Laterality Date    COLONOSCOPY      KNEE ARTHROSCOPY Bilateral     knee injuries    LAPAROSCOPY, SURGICAL; NEPHRECTOMY Right April 2015    Robotic surgery at [facility]    OTHER SURGICAL HISTORY      Microdiscectomy L4-5; good outcome, Completed at [facility] (Disc displacement)    RECTUM SURGERY PROCEDURE UNLISTED  1994    repair of rectal tear    SPINE SURGERY PROCEDURE UNLISTED      lumbar microdiscectomy    TONSILLECTOMY      tonstillitis 1993    UPPER GI ENDOSCOPY,EXAM        Family History   Problem Relation Age of Onset    Cancer Father         salivary and kidney    Hypertension Mother     Arthritis Mother         gout      Social History:   Social History     Socioeconomic History    Marital status:     Number of children: 2   Occupational History    Occupation:  /      Employer: [employer]   Tobacco Use    Smoking status: Former     Types: Cigars    Smokeless tobacco: Never    Tobacco comments:     tobacco on and off for 20 years   Vaping Use    Vaping Use: Never used   Substance and Sexual Activity    Alcohol use: Yes     Comment: 4 times a week    Drug use: Yes     Types: Cannabis      Comment: daily    Sexual activity: Not Currently     Partners: Female   Other Topics Concern    Caffeine Concern Yes     Comment: coffee, chocolate, 1 cup daily        Medications (Active prior to today's visit):  Current Outpatient Medications   Medication Sig Dispense Refill    Podofilox 0.5 % External Solution Apply 1 Application topically 2 (two) times daily. 3.5 mL 1    terazosin 5 MG Oral Cap Take 1 capsule (5 mg total) by mouth nightly. 90 capsule 1    amLODIPine 10 MG Oral Tab Take 1 tablet (10 mg total) by mouth daily. 90 tablet 3    metoprolol succinate  MG Oral Tablet 24 Hr Take 1 tablet (100 mg total) by mouth daily. Along with 1 tablet (50mg) to equal 150mg daily dose 90 tablet 3    metoprolol succinate ER 50 MG Oral Tablet 24 Hr Take 1 tablet (50 mg total) by mouth daily. Along with 1 tablet (100mg) to equal 150mg daily dose. 90 tablet 3    Olmesartan Medoxomil 40 MG Oral Tab Take 1 tablet (40 mg total) by mouth daily. Temporary Refill, Call doctor's office, Appointment needed for further refills. 90 tablet 3    cholecalciferol 125 MCG (5000 UT) Oral Tab Take 1 tablet (5,000 Units total) by mouth daily.      omega-3 fatty acids 1000 MG Oral Cap Take 1,000 mg by mouth daily.      Vitamin E 180 MG (400 UNIT) Oral Cap Take by mouth.      psyllium (METAMUCIL) 28 % Oral Powd Pack Take 1 packet by mouth 2 (two) times daily.      Digital Therapy (HELLO HEART BLOOD PRESSURE) Does not apply Kit daily.      Multiple Vitamins-Minerals (MULTIVITAMIN ADULT OR) Take by mouth.         Allergies:  Allergies   Allergen Reactions    Lisinopril-Hydrochlorothiazide      Other reaction(s): Other  Severe leg cramps and excessive urination   Severe leg cramps    Wheat Gluten DIARRHEA and NAUSEA AND VOMITING    Wheat Germ OTHER (SEE COMMENTS)         ROS:       PHYSICAL EXAM:   Digital prostate exam demonstrates a normal sphincter tone, prostate which is approximately 55 g smooth symmetric without masses or nodules.  He  has some condyloma x1 lesion 1 cm in size along the mons pubis.  States he has a history of condyloma along the penis and scrotal skin which she had frozen off by dermatologist in the past.     ASSESSMENT/PLAN:   Assessment   Encounter Diagnoses   Name Primary?    Elevated PSA Yes    BPH with obstruction/lower urinary tract symptoms     Malignant neoplasm of right kidney (HCC)        Recommend:  - Follow-up in 6 months with a repeat PSA.  He understands that a repeat biopsy is typically suggested at the 1 year riley if atypical small acinar proliferation is detected.  - Condylox x 4 cycles.  If persistent, I advised the patient to see a dermatologist given the location of the lesion.  I spent a total of 25 minutes with patient more than half time face-to-face discussion.         Orders This Visit:  No orders of the defined types were placed in this encounter.      Meds This Visit:  Requested Prescriptions     Signed Prescriptions Disp Refills    Podofilox 0.5 % External Solution 3.5 mL 1     Sig: Apply 1 Application topically 2 (two) times daily.       Imaging & Referrals:  None     3/25/2024  Brian Dee MD

## 2024-05-23 PROCEDURE — 88305 TISSUE EXAM BY PATHOLOGIST: CPT | Performed by: INTERNAL MEDICINE

## 2024-05-30 RX ORDER — OLMESARTAN MEDOXOMIL 40 MG/1
40 TABLET ORAL DAILY
Qty: 90 TABLET | Refills: 3 | Status: SHIPPED | OUTPATIENT
Start: 2024-05-30

## 2024-05-30 NOTE — TELEPHONE ENCOUNTER
Please Review. Protocol Failed; No Protocol   BP Readings from Last 1 Encounters:   05/23/24 148/85   Recent Visits  Date Type Provider Dept   03/20/24 Office Visit Petey Oquendo MD [facility]   09/22/23 Office Visit Petey Oquendo MD [facility]   07/21/23 Office Visit Petey Oquendo MD [facility]   01/20/23 Office Visit Petey Oquendo MD [facility]   Showing recent visits within past 540 days with a meds authorizing provider and meeting all other requirements  Future Appointments  Date Type Provider Dept   06/21/24 Appointment Petey Oquendo MD [facility]   Showing future appointments within next 150 days with a meds authorizing provider and meeting all other requirements    Requested Prescriptions   Pending Prescriptions Disp Refills    OLMESARTAN MEDOXOMIL 40 MG Oral Tab [Pharmacy Med Name: OLMESARTAN MEDOXOMIL 40 MG TAB] 90 tablet 3     Sig: TAKE 1 TABLET BY MOUTH EVERY DAY. APPIONTMENT NEEDED FOR MORE REFILLS       Hypertension Medications Protocol Failed - 5/27/2024  8:56 AM        Failed - Last BP reading less than 140/90     BP Readings from Last 1 Encounters:   05/23/24 148/85               Passed - CMP or BMP in past 12 months        Passed - In person appointment or virtual visit in the past 12 mos or appointment in next 3 mos     Recent Out[facility]its              [facility]    2 months ago [facility]    7 months ago [facility] in bowel [facility]    [facility]    8 months ago [facility] PSA    [facility]    [facility]    8 months ago Essential hypertension with goal blood pressure less than 140/90     [facility] Petey Oquendo MD    Office Visit          Future Appointments         Provider Department Appt Notes    In 3 weeks Petey Oquendo MD [facility] 3M BP CHECK    In 4 months Brian Dee MD [facility] 6 month                    Passed - EGFRCR or GFRAA > 50     GFR Evaluation  EGFRCR: 60 , resulted on 3/15/2024                 Future Appointments         Provider Department Appt Notes    In 3 weeks Petey Oquendo MD [facility] 3M BP CHECK    In 4 months Brian Dee MD [facility] 6 month          Recent Outpatient Visits              2 months ago [facility]    Office Visit    2 months ago Routine physical examination    [facility]    Office Visit    7 months ago Change in bowel habits    [facility]    Office Visit    8 months ago Elevated PSA    [facility]    Office Visit    8 months ago Essential hypertension with goal blood pressure less than 140/90    [facility]    Office Visit

## 2024-06-17 ENCOUNTER — TELEPHONE (OUTPATIENT)
Facility: CLINIC | Age: 64
End: 2024-06-17

## 2024-06-17 NOTE — TELEPHONE ENCOUNTER
Results letter mailed to patient per   Colon recall entered for repeat in 3 yrs, 5/23/2027  Colon done in 5/23/2024  HM Updated and Patient Outreach was placed for Colon recall   GI RNs - please print and mail this letter to pt; recall for colonoscopy exam in 3yrs   felicia

## 2024-06-21 ENCOUNTER — OFFICE VISIT (OUTPATIENT)
Dept: INTERNAL MEDICINE CLINIC | Facility: CLINIC | Age: 64
End: 2024-06-21

## 2024-06-21 VITALS
BODY MASS INDEX: 27.7 KG/M2 | SYSTOLIC BLOOD PRESSURE: 136 MMHG | WEIGHT: 187 LBS | HEIGHT: 69 IN | DIASTOLIC BLOOD PRESSURE: 60 MMHG | HEART RATE: 83 BPM

## 2024-06-21 DIAGNOSIS — M10.9 GOUT, UNSPECIFIED CAUSE, UNSPECIFIED CHRONICITY, UNSPECIFIED SITE: ICD-10-CM

## 2024-06-21 DIAGNOSIS — J30.9 ALLERGIC RHINITIS, UNSPECIFIED SEASONALITY, UNSPECIFIED TRIGGER: ICD-10-CM

## 2024-06-21 DIAGNOSIS — I10 ESSENTIAL HYPERTENSION WITH GOAL BLOOD PRESSURE LESS THAN 140/90: Primary | ICD-10-CM

## 2024-06-21 DIAGNOSIS — N40.0 BENIGN PROSTATIC HYPERPLASIA, UNSPECIFIED WHETHER LOWER URINARY TRACT SYMPTOMS PRESENT: ICD-10-CM

## 2024-06-21 PROCEDURE — 3078F DIAST BP <80 MM HG: CPT | Performed by: INTERNAL MEDICINE

## 2024-06-21 PROCEDURE — 99214 OFFICE O/P EST MOD 30 MIN: CPT | Performed by: INTERNAL MEDICINE

## 2024-06-21 PROCEDURE — 3008F BODY MASS INDEX DOCD: CPT | Performed by: INTERNAL MEDICINE

## 2024-06-21 PROCEDURE — 3075F SYST BP GE 130 - 139MM HG: CPT | Performed by: INTERNAL MEDICINE

## 2024-06-21 NOTE — PROGRESS NOTES
HPI:    Patient ID: Hima Reyez is a 64 year old male.    HPI  Patient is here for follow-up on chronic medical issues, mostly blood pressure.  Last seen here on March 28 for general physical exam.  Blood pressure by nurse at that time 136/72.  Blood pressure by physician 158/74.  Blood pressure was also somewhat elevated at home.  We increased the terazosin from 2 mg up to 5 mg a day.  Since that time he has seen urology.  Current medications reviewed.  Health maintenance and vaccination status reviewed.  Weight is down 5 pounds from his last visit.    Has been having more allergies this year. Takes saline nasal spray, loratadine. He is walking more. Diet is good and healthy. Patient does check blood pressure at home. It has been running 120-160/70-88. No recent gout. Back is ok.     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    BPH (benign prostatic hyperplasia)    Nocturia          HISTORY:  Past Medical History:    Back problem    lumbar microdiscectomy    BPH (benign prostatic hyperplasia)    Cardiovascular disorder    Cardiac calcium scan- normal    Celiac disease (HCC)    Disc displacement    Microdiscectomy L4-5; good outcome, Completed at [facility]    High blood pressure    High cholesterol    Hypertension    Knee injuries    bilateral, arthroscopy, detached patella Rt /Lt Football injury    Leg cramps    Severe leg cramps with lisinopril-HCTZ    Osteoarthritis    Renal cell carcinoma (HCC)    Right kidney removed    Tonsillitis    tonsillectomy    Visual impairment    reading glasses      Past Surgical History:   Procedure Laterality Date    Colonoscopy      Colonoscopy N/A 5/23/2024    Procedure: COLONOSCOPY;  Surgeon: Colton Bailey MD;  Location: [facility] MAIN OR    Knee arthroscopy Bilateral     knee injuries    Laparoscopy, surgical; nephrectomy Right April 2015    Robotic surgery at [facility]    Other surgical  history      Microdiscectomy L4-5; good outcome, Completed at [facility] (Disc displacement)    Rectum surgery procedure unlisted  1994    repair of rectal tear    Spine surgery procedure unlisted      lumbar microdiscectomy    Tonsillectomy      tonstillitis 1993    Upper gi endoscopy,exam        Family History   Problem Relation Age of Onset    Cancer Father         salivary and kidney    Hypertension Mother     Arthritis Mother         gout      Social History     Socioeconomic History    Marital status:     Number of children: 2   Occupational History    Occupation:  /      Employer: [employer]   Tobacco Use    Smoking status: Former     Types: Cigars    Smokeless tobacco: Never    Tobacco comments:     tobacco on and off for 20 years   Vaping Use    Vaping status: Never Used   Substance and Sexual Activity    Alcohol use: Yes     Comment: 4 times a week    Drug use: Yes     Types: Cannabis     Comment: daily    Sexual activity: Not Currently     Partners: Female   Other Topics Concern    Caffeine Concern Yes     Comment: coffee, chocolate, 1 cup daily     Social Determinants of Health      [facility]    Social Connections    [facility]    Housing Stability          Review of Systems          Current Outpatient Medications   Medication Sig Dispense Refill    Olmesartan Medoxomil 40 MG Oral Tab Take 1 tablet (40 mg total) by mouth daily. 90 tablet 3    terazosin 5 MG Oral Cap Take 1 capsule (5 mg total) by mouth nightly. 90 capsule 1    amLODIPine 10 MG Oral Tab Take 1 tablet (10 mg total) by mouth daily. 90 tablet 3    metoprolol succinate  MG Oral Tablet 24 Hr Take 1 tablet (100 mg total) by mouth daily. Along with 1 tablet (50mg) to equal 150mg daily dose 90 tablet 3    metoprolol succinate ER 50 MG Oral Tablet 24 Hr Take 1 tablet  (50 mg total) by mouth daily. Along with 1 tablet (100mg) to equal 150mg daily dose. 90 tablet 3    cholecalciferol 125 MCG (5000 UT) Oral Tab Take 1 tablet (5,000 Units total) by mouth daily.      omega-3 fatty acids 1000 MG Oral Cap Take 1,000 mg by mouth daily.      Vitamin E 180 MG (400 UNIT) Oral Cap Take by mouth.      psyllium (METAMUCIL) 28 % Oral Powd Pack Take 1 packet by mouth 2 (two) times daily.      Digital Therapy (HELLO HEART BLOOD PRESSURE) Does not apply Kit daily.      Multiple Vitamins-Minerals (MULTIVITAMIN ADULT OR) Take by mouth.       Allergies:  Allergies   Allergen Reactions    Lisinopril-Hydrochlorothiazide      Other reaction(s): Other  Severe leg cramps and excessive urination   Severe leg cramps    Wheat Gluten DIARRHEA and NAUSEA AND VOMITING    Wheat Germ OTHER (SEE COMMENTS)        PHYSICAL EXAM:   /60   Pulse 83   Ht 5' 9\" (1.753 m)   Wt 187 lb (84.8 kg)   BMI 27.62 kg/m²      Physical Exam  Constitutional:       Appearance: Normal appearance. He is well-developed.   HENT:      Nose: Nose normal.      Mouth/Throat:      Pharynx: No oropharyngeal exudate or posterior oropharyngeal erythema.   Eyes:      Conjunctiva/sclera: Conjunctivae normal.   Neck:      Vascular: No carotid bruit.   Cardiovascular:      Rate and Rhythm: Normal rate and regular rhythm.      Pulses: Normal pulses.      Heart sounds: Normal heart sounds. No murmur heard.  Pulmonary:      Effort: Pulmonary effort is normal.      Breath sounds: Normal breath sounds. No wheezing or rales.   Abdominal:      General: Bowel sounds are normal.      Palpations: Abdomen is soft. There is no mass.      Tenderness: There is no abdominal tenderness.   Musculoskeletal:      Right lower leg: No edema.      Left lower leg: No edema.   Lymphadenopathy:      Cervical: No cervical adenopathy.   Skin:     General: Skin is warm and dry.      Findings: No rash.   Neurological:      General: No focal deficit present.      Mental  Status: He is alert.   Psychiatric:         Mood and Affect: Mood normal.         Behavior: Behavior normal.         Thought Content: Thought content normal.          Wt Readings from Last 6 Encounters:   06/21/24 187 lb (84.8 kg)   05/20/24 192 lb (87.1 kg)   03/20/24 192 lb (87.1 kg)   01/18/24 185 lb (83.9 kg)   10/11/23 188 lb (85.3 kg)   09/22/23 185 lb (83.9 kg)             ASSESSMENT/PLAN:   1. Essential hypertension with goal blood pressure less than 140/90   Blood pressure is reasonably controlled here in the office.  Somewhat up-and-down at home.  We will continue the same medication.  Did advise him to switch the metoprolol from 150 mg in the morning to 100 mg in the morning and 50 mg in the evening.  That may better balance out his blood pressure control as it seems to be higher during the earlier part of the day.  Follow-up in 6 months.  However follow-up sooner if blood pressure at home is running greater than 140/90.    2. Benign prostatic hyperplasia, unspecified whether lower urinary tract symptoms present  Stable.  Continue with terazosin.  Recent PSA was normal.    3. Gout, unspecified cause, unspecified chronicity, unspecified site  No recent flares of gout.  Continue current treatment.    4. Allergic rhinitis, unspecified seasonality, unspecified trigger  Allergies have been worse this season.  It has been a difficult spring for a lot of people.  Advised to switch from loratadine over to Xyzal 5 mg a day.  Also add in either Flonase or Nasacort 2 sprays each nostril once a day.  Call if not improving.  May consider Singulair 10 mg a day at that time.         Meds This Visit:  Requested Prescriptions      No prescriptions requested or ordered in this encounter       Imaging & Referrals:  None         Petey Oquendo MD

## 2024-06-21 NOTE — PATIENT INSTRUCTIONS
For allergies, change from loratadine over to Xyzal 5 mg a day.    Start either Flonase or Nasacort- 2 sprays in each nostril daily.     Take the metoprolol 50 mg in the evening. Otherwise, continue same BP medications.     Return in 6 months. Sooner if home BP is above 140/90

## 2024-07-03 RX ORDER — TERAZOSIN 5 MG/1
5 CAPSULE ORAL NIGHTLY
Qty: 90 CAPSULE | Refills: 3 | Status: SHIPPED | OUTPATIENT
Start: 2024-07-03

## 2024-07-03 NOTE — TELEPHONE ENCOUNTER
REFILL PASSED PER [facility] PROTOCOLS    Requested Prescriptions   Pending Prescriptions Disp Refills    TERAZOSIN 5 MG Oral Cap [Pharmacy Med Name: TERAZOSIN 5 MG CAPSULE] 90 capsule 1     Sig: TAKE 1 CAPSULE BY MOUTH NIGHTLY       Hypertension Medications Protocol Passed - 6/29/2024  9:22 AM        Passed - CMP or BMP in past 12 months        Passed - Last BP reading less than 140/90     BP Readings from Last 1 Encounters:   06/21/24 136/60               Passed - In person appointment or virtual visit in the past 12 mos or appointment in next 3 mos     Recent Outpatient Visits              1 week ago Essential hypertension with goal blood pressure less than 140/90    [facility] Petey Oquendo MD    Office Visit    3 months ago Elevated PSA    [facility] Brian Dee MD    Office Visit    3 months ago Routine physical examination    [facility] Petey Oquendo MD    Office Visit    8 months ago Change in bowel habits    [facility] Colton Bailey MD    Office Visit    9 months ago Elevated PSA    [facility] Brian Dee MD    Office Visit          Future Appointments         Provider Department Appt Notes    In 3 months Brian Dee MD [facility] 6 month    In 5 months Petey Oquendo MD [facility] 6M FU                    Passed - EGFRCR or GFRAA > 50     GFR Evaluation  EGFRCR: 60 , resulted on 3/15/2024               Future Appointments         Provider Department Appt Notes    In 3 months Brian Dee MD [facility] 6 month    In 5 months Petey Oquendo MD [facility] 6M FU          Recent  Outpatient Visits              1 week ago Essential hypertension with goal blood pressure less than 140/90    [facility] Petey Huang MD    Office Visit    3 months ago Elevated PSA    [facility], Brian Zaldivar MD    Office Visit    3 months ago Routine physical examination    [facility] Petey Huang MD    Office Visit    8 months ago Change in bowel habits    [facility], [place] MD    Office Visit    9 months ago Elevated PSA    [facility], Brian Zaldivar MD    Office Visit

## 2024-09-21 ENCOUNTER — IMMUNIZATION (OUTPATIENT)
Dept: LAB | Age: 64
End: 2024-09-21
Attending: EMERGENCY MEDICINE
Payer: COMMERCIAL

## 2024-09-21 DIAGNOSIS — Z23 NEED FOR VACCINATION: Primary | ICD-10-CM

## 2024-09-21 PROCEDURE — 90480 ADMN SARSCOV2 VAC 1/ONLY CMP: CPT

## 2024-10-04 ENCOUNTER — LAB ENCOUNTER (OUTPATIENT)
Dept: LAB | Facility: HOSPITAL | Age: 64
End: 2024-10-04
Attending: UROLOGY
Payer: COMMERCIAL

## 2024-10-04 ENCOUNTER — HOSPITAL ENCOUNTER (OUTPATIENT)
Dept: GENERAL RADIOLOGY | Facility: HOSPITAL | Age: 64
Discharge: HOME | End: 2024-10-04
Attending: UROLOGY
Payer: COMMERCIAL

## 2024-10-04 ENCOUNTER — OFFICE VISIT (OUTPATIENT)
Dept: SURGERY | Facility: CLINIC | Age: 64
End: 2024-10-04
Payer: COMMERCIAL

## 2024-10-04 DIAGNOSIS — C64.9 MALIGNANT NEOPLASM OF KIDNEY, UNSPECIFIED LATERALITY (HCC): ICD-10-CM

## 2024-10-04 DIAGNOSIS — N13.8 BPH WITH OBSTRUCTION/LOWER URINARY TRACT SYMPTOMS: ICD-10-CM

## 2024-10-04 DIAGNOSIS — R97.20 ELEVATED PSA: ICD-10-CM

## 2024-10-04 DIAGNOSIS — R97.20 ELEVATED PSA: Primary | ICD-10-CM

## 2024-10-04 DIAGNOSIS — N40.1 BPH WITH OBSTRUCTION/LOWER URINARY TRACT SYMPTOMS: ICD-10-CM

## 2024-10-04 LAB — PSA SERPL-MCNC: 1.93 NG/ML (ref ?–4)

## 2024-10-04 PROCEDURE — 71046 X-RAY EXAM CHEST 2 VIEWS: CPT | Performed by: UROLOGY

## 2024-10-04 PROCEDURE — 99214 OFFICE O/P EST MOD 30 MIN: CPT | Performed by: UROLOGY

## 2024-10-04 PROCEDURE — 84153 ASSAY OF PSA TOTAL: CPT

## 2024-10-04 PROCEDURE — 36415 COLL VENOUS BLD VENIPUNCTURE: CPT

## 2024-10-04 NOTE — PROGRESS NOTES
Hima Reyez is a 64 year old male.    HPI:     Chief Complaint   Patient presents with    Follow - Up     Patient is here for a follow-up.  He is taking Terazosin he says it helps with his prostate symptoms.  He denies any symptoms or concerns.  He did not have a recent PSA done.          elevated psa     64-year-old male in follow-up to visit March 25, 2024.  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  Biopsy was done for a PSA of 5.62 April 2023.  He denies any bone pain or weight loss.  He remains on terazosin 5 mg daily both for his BPH symptoms as well as his hypertension.  He states his urination symptoms are well-controlled.  IPSS score today is 11 quality-of-life index is 3.    Most recent PSA March 2024 has drastically improved down to 1.57.  Plan had been for a repeat PSA today but he forgot to obtain it.    Has a history of right renal cell cancer.  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  No evidence of disease recurrence since then.  CT upper abdomen and pelvis at the [facility] November 2016 showed no evidence of metastatic disease with incidental mild left-sided hydroureter of uncertain etiology.  Follow-up renal ultrasound June 2017 was unremarkable.  He denies any flank pain.  No gross hematuria.    Has BPH and lower urinary tract symptoms well-controlled on terazosin 5 mg daily.  HISTORY:  Past Medical History:    Back problem    lumbar microdiscectomy    BPH (benign prostatic hyperplasia)    Cardiovascular disorder    Cardiac calcium scan- normal    Celiac disease (HCC)    Disc displacement    Microdiscectomy L4-5; good outcome, Completed at [facility]    High blood pressure    High cholesterol    Hypertension    Knee  injuries    bilateral, arthroscopy, detached patella Rt /Lt Football injury    Leg cramps    Severe leg cramps with lisinopril-HCTZ    Osteoarthritis    Renal cell carcinoma (HCC)    Right kidney removed    Tonsillitis    tonsillectomy    Visual impairment    reading glasses      Past Surgical History:   Procedure Laterality Date    Colonoscopy      Colonoscopy N/A 5/23/2024    Procedure: COLONOSCOPY;  Surgeon: Colton Bailey MD;  Location: [facility] MAIN OR    Knee arthroscopy Bilateral     knee injuries    Laparoscopy, surgical; nephrectomy Right April 2015    Robotic surgery at [facility]    Other surgical history      Microdiscectomy L4-5; good outcome, Completed at [facility] (Disc displacement)    Rectum surgery procedure unlisted  1994    repair of rectal tear    Spine surgery procedure unlisted      lumbar microdiscectomy    Tonsillectomy      tonstillitis 1993    Upper gi endoscopy,exam        Family History   Problem Relation Age of Onset    Cancer Father         salivary and kidney    Hypertension Mother     Arthritis Mother         gout      Social History:   Social History     Socioeconomic History    Marital status:     Number of children: 2   Occupational History    Occupation:  /      Employer: [employer]   Tobacco Use    Smoking status: Former     Types: Cigars    Smokeless tobacco: Never    Tobacco comments:     tobacco on and off for 20 years   Vaping Use    Vaping status: Never Used   Substance and Sexual Activity    Alcohol use: Yes     Comment: 4 times a week    Drug use: Yes     Types: Cannabis     Comment: daily    Sexual activity: Not Currently     Partners: Female   Other Topics Concern    Caffeine Concern Yes     Comment: coffee, chocolate, 1 cup daily     Social Determinants of Health      [facility]    Social Connections    Received from [facility],  [facility]    Housing Stability        Medications (Active prior to today's visit):  Current Outpatient Medications   Medication Sig Dispense Refill    terazosin 5 MG Oral Cap Take 1 capsule (5 mg total) by mouth nightly. 90 capsule 3    Olmesartan Medoxomil 40 MG Oral Tab Take 1 tablet (40 mg total) by mouth daily. 90 tablet 3    amLODIPine 10 MG Oral Tab Take 1 tablet (10 mg total) by mouth daily. 90 tablet 3    metoprolol succinate  MG Oral Tablet 24 Hr Take 1 tablet (100 mg total) by mouth daily. Along with 1 tablet (50mg) to equal 150mg daily dose 90 tablet 3    metoprolol succinate ER 50 MG Oral Tablet 24 Hr Take 1 tablet (50 mg total) by mouth daily. Along with 1 tablet (100mg) to equal 150mg daily dose. 90 tablet 3    cholecalciferol 125 MCG (5000 UT) Oral Tab Take 1 tablet (5,000 Units total) by mouth daily.      omega-3 fatty acids 1000 MG Oral Cap Take 1,000 mg by mouth daily.      Vitamin E 180 MG (400 UNIT) Oral Cap Take by mouth.      psyllium (METAMUCIL) 28 % Oral Powd Pack Take 1 packet by mouth 2 (two) times daily.      Digital Therapy (HELLO HEART BLOOD PRESSURE) Does not apply Kit daily.      Multiple Vitamins-Minerals (MULTIVITAMIN ADULT OR) Take by mouth.         Allergies:  Allergies   Allergen Reactions    Lisinopril-Hydrochlorothiazide      Other reaction(s): Other  Severe leg cramps and excessive urination   Severe leg cramps    Wheat Gluten DIARRHEA and NAUSEA AND VOMITING    Wheat Germ OTHER (SEE COMMENTS)         ROS:       PHYSICAL EXAM:   Digital prostate exam demonstrates a normal sphincter tone, prostate which is approximately 55 g smooth symmetric without masses or nodules.     ASSESSMENT/PLAN:   Assessment   Encounter Diagnoses   Name Primary?    Malignant neoplasm of kidney, unspecified laterality (HCC)     Elevated PSA Yes    BPH with obstruction/lower urinary tract symptoms        Recommend:  - Repeat PSA to be done today.  - Chest x-ray PA and lateral  to be done today.  - Follow-up tentatively in 6 months pending above results.    I spent a total of 25 minutes with patient more than half the time in face-to-face discussion.         Orders This Visit:  No orders of the defined types were placed in this encounter.      Meds This Visit:  Requested Prescriptions      No prescriptions requested or ordered in this encounter       Imaging & Referrals:  None     10/4/2024  Brian Dee MD

## 2024-12-20 ENCOUNTER — OFFICE VISIT (OUTPATIENT)
Dept: INTERNAL MEDICINE CLINIC | Facility: CLINIC | Age: 64
End: 2024-12-20
Payer: COMMERCIAL

## 2024-12-20 VITALS
RESPIRATION RATE: 18 BRPM | HEIGHT: 69 IN | HEART RATE: 72 BPM | DIASTOLIC BLOOD PRESSURE: 82 MMHG | WEIGHT: 188 LBS | SYSTOLIC BLOOD PRESSURE: 154 MMHG | BODY MASS INDEX: 27.85 KG/M2 | TEMPERATURE: 98 F

## 2024-12-20 DIAGNOSIS — R97.20 ELEVATED PSA: ICD-10-CM

## 2024-12-20 DIAGNOSIS — M10.9 GOUT, UNSPECIFIED CAUSE, UNSPECIFIED CHRONICITY, UNSPECIFIED SITE: ICD-10-CM

## 2024-12-20 DIAGNOSIS — I10 ESSENTIAL HYPERTENSION WITH GOAL BLOOD PRESSURE LESS THAN 140/90: Primary | ICD-10-CM

## 2024-12-20 DIAGNOSIS — Z85.528 HISTORY OF KIDNEY CANCER: ICD-10-CM

## 2024-12-20 DIAGNOSIS — Z23 NEED FOR VACCINATION: ICD-10-CM

## 2024-12-20 DIAGNOSIS — N40.0 BENIGN PROSTATIC HYPERPLASIA, UNSPECIFIED WHETHER LOWER URINARY TRACT SYMPTOMS PRESENT: ICD-10-CM

## 2024-12-20 PROCEDURE — 99214 OFFICE O/P EST MOD 30 MIN: CPT | Performed by: INTERNAL MEDICINE

## 2024-12-20 PROCEDURE — 3079F DIAST BP 80-89 MM HG: CPT | Performed by: INTERNAL MEDICINE

## 2024-12-20 PROCEDURE — 90677 PCV20 VACCINE IM: CPT | Performed by: INTERNAL MEDICINE

## 2024-12-20 PROCEDURE — 3077F SYST BP >= 140 MM HG: CPT | Performed by: INTERNAL MEDICINE

## 2024-12-20 PROCEDURE — 90471 IMMUNIZATION ADMIN: CPT | Performed by: INTERNAL MEDICINE

## 2024-12-20 PROCEDURE — G2211 COMPLEX E/M VISIT ADD ON: HCPCS | Performed by: INTERNAL MEDICINE

## 2024-12-20 PROCEDURE — 3008F BODY MASS INDEX DOCD: CPT | Performed by: INTERNAL MEDICINE

## 2024-12-20 RX ORDER — TERAZOSIN 10 MG/1
10 CAPSULE ORAL NIGHTLY
Qty: 90 CAPSULE | Refills: 1 | Status: SHIPPED | OUTPATIENT
Start: 2024-12-20

## 2024-12-20 NOTE — PROGRESS NOTES
HPI:    Patient ID: Hima Reyez is a 64 year old male.    HPI    Patient returns to the office today to discuss chronic medical issues as listed on the active pr June 21 for blood pressure check.  Blood pressure at that time 136/60.  Oblem list below.  Patient last seen in the office by me on    During the last visit, the following changes were made: Changing the timing of the metoprolol from 150 mg once a day to 100 in the morning and 50 in the evening.  Since the last visit, the patient has seen the following doctors: Urology for elevated PSA and history of kidney cancer.    Today, the patient offers the following complaints: no major issues Patient does check blood pressure at home. It has been running 121-161/75-92.  Patient describes diet as good. He has been different types of grain given his gluten issues.   For exercise, the patient walks. He will start with indoor cycling and treadmill.  Tobacco and alcohol use reviewed.   Current medications reviewed.   Health maintenance issues reviewed.    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    BPH (benign prostatic hyperplasia)    Nocturia        HISTORY:  Past Medical History:    Back problem    lumbar microdiscectomy    BPH (benign prostatic hyperplasia)    Cardiovascular disorder    Cardiac calcium scan- normal    Celiac disease (HCC)    Disc displacement    Microdiscectomy L4-5; good outcome, Completed at [facility]    High blood pressure    High cholesterol    Hypertension    Knee injuries    bilateral, arthroscopy, detached patella Rt /Lt Football injury    Leg cramps    Severe leg cramps with lisinopril-HCTZ    Osteoarthritis    Renal cell carcinoma (HCC)    Right kidney  removed    Tonsillitis    tonsillectomy    Visual impairment    reading glasses      Past Surgical History:   Procedure Laterality Date    Colonoscopy      Colonoscopy N/A 5/23/2024    Procedure: COLONOSCOPY;  Surgeon: Colton Bailey MD;  Location: [facility] MAIN OR    Knee arthroscopy Bilateral     knee injuries    Laparoscopy, surgical; nephrectomy Right April 2015    Robotic surgery at [facility]    Other surgical history      Microdiscectomy L4-5; good outcome, Completed at [facility] (Disc displacement)    Rectum surgery procedure unlisted  1994    repair of rectal tear    Spine surgery procedure unlisted      lumbar microdiscectomy    Tonsillectomy      tonstillitis 1993    Upper gi endoscopy,exam        Family History   Problem Relation Age of Onset    Cancer Father         salivary and kidney    Hypertension Mother     Arthritis Mother         gout      Social History     Socioeconomic History    Marital status:     Number of children: 2   Occupational History    Occupation:  /      Employer: [employer]   Tobacco Use    Smoking status: Former     Types: Cigars    Smokeless tobacco: Never    Tobacco comments:     tobacco on and off for 20 years   Vaping Use    Vaping status: Never Used   Substance and Sexual Activity    Alcohol use: Yes     Comment: 4 times a week    Drug use: Yes     Types: Cannabis     Comment: daily    Sexual activity: Not Currently     Partners: Female   Other Topics Concern    Caffeine Concern Yes     Comment: coffee, chocolate, 1 cup daily     Social Drivers of Health      [facility]    Social Connections    [facility], [facility]    Housing Stability          Review of Systems          Current Outpatient Medications   Medication Sig Dispense Refill    terazosin 5 MG Oral Cap Take 1 capsule (5 mg total) by mouth  nightly. 90 capsule 3    Olmesartan Medoxomil 40 MG Oral Tab Take 1 tablet (40 mg total) by mouth daily. 90 tablet 3    amLODIPine 10 MG Oral Tab Take 1 tablet (10 mg total) by mouth daily. 90 tablet 3    metoprolol succinate  MG Oral Tablet 24 Hr Take 1 tablet (100 mg total) by mouth daily. Along with 1 tablet (50mg) to equal 150mg daily dose 90 tablet 3    metoprolol succinate ER 50 MG Oral Tablet 24 Hr Take 1 tablet (50 mg total) by mouth daily. Along with 1 tablet (100mg) to equal 150mg daily dose. 90 tablet 3    cholecalciferol 125 MCG (5000 UT) Oral Tab Take 1 tablet (5,000 Units total) by mouth daily.      omega-3 fatty acids 1000 MG Oral Cap Take 1,000 mg by mouth daily.      Vitamin E 180 MG (400 UNIT) Oral Cap Take by mouth.      psyllium (METAMUCIL) 28 % Oral Powd Pack Take 1 packet by mouth 2 (two) times daily.      Digital Therapy (HELLO HEART BLOOD PRESSURE) Does not apply Kit daily.      Multiple Vitamins-Minerals (MULTIVITAMIN ADULT OR) Take by mouth.       Allergies:Allergies[1]     PHYSICAL EXAM:   /76 (BP Location: Left arm, Patient Position: Sitting, Cuff Size: large)   Pulse 72   Temp 97.8 °F (36.6 °C) (Other)   Resp 18   Ht 5' 9\" (1.753 m)   Wt 188 lb (85.3 kg)   BMI 27.76 kg/m²      Physical Exam  Constitutional:       Appearance: Normal appearance. He is well-developed.   HENT:      Nose: Nose normal.      Mouth/Throat:      Pharynx: No oropharyngeal exudate or posterior oropharyngeal erythema.   Eyes:      Conjunctiva/sclera: Conjunctivae normal.   Neck:      Vascular: No carotid bruit.   Cardiovascular:      Rate and Rhythm: Normal rate and regular rhythm.      Pulses: Normal pulses.      Heart sounds: Normal heart sounds. No murmur heard.  Pulmonary:      Effort: Pulmonary effort is normal.      Breath sounds: Normal breath sounds. No wheezing or rales.   Abdominal:      General: Bowel sounds are normal.      Palpations: Abdomen is soft. There is no mass.      Tenderness:  There is no abdominal tenderness.   Musculoskeletal:      Right lower leg: No edema.      Left lower leg: No edema.   Lymphadenopathy:      Cervical: No cervical adenopathy.   Skin:     General: Skin is warm and dry.      Findings: No rash.   Neurological:      General: No focal deficit present.      Mental Status: He is alert.   Psychiatric:         Mood and Affect: Mood normal.         Behavior: Behavior normal.         Thought Content: Thought content normal.                 ASSESSMENT/PLAN:   1. Need for vaccination  Vaccination status reviewed.  Given pneumonia shot today.  Is up-to-date on other shots.  - PCV20 (Prevnar 20)    2. Essential hypertension with goal blood pressure less than 140/90  Blood pressure is still not fully adequately controlled.  Will increase the terazosin from 5 up to 10 mg a day.  Continue other treatment.  Has not tolerated hydrochlorothiazide in the past so we will not use that or chlorthalidone at this time.  Follow-up in 3 months for general physical and full blood work.    3. Benign prostatic hyperplasia, unspecified whether lower urinary tract symptoms present  Increased dose of terazosin mainly due to the blood pressure issue.    4. Elevated PSA  Much better recently.  Follow-up with urology.  They will need to take into consideration the terazosin dosing as well.    5. Gout, unspecified cause, unspecified chronicity, unspecified site  No recent episodes.    6. History of kidney cancer  No evidence of active disease or recurrence.  Had the surgery back in 2015.         Meds This Visit:  Requested Prescriptions      No prescriptions requested or ordered in this encounter       Imaging & Referrals:  None         Petey Oquendo MD        [1]   Allergies  Allergen Reactions    Lisinopril-Hydrochlorothiazide      Other reaction(s): Other  Severe leg cramps and excessive urination   Severe leg cramps    Wheat Gluten DIARRHEA and NAUSEA AND VOMITING    Wheat Germ OTHER (SEE COMMENTS)

## 2025-02-17 NOTE — TELEPHONE ENCOUNTER
Please Review. Protocol Failed; No Protocol   BP Readings from Last 3 Encounters:   12/20/24 154/82   06/21/24 136/60   05/23/24 148/85       Requested Prescriptions   Pending Prescriptions Disp Refills    AMLODIPINE 10 MG Oral Tab [Pharmacy Med Name: AMLODIPINE BESYLATE 10 MG TAB] 90 tablet 3     Sig: TAKE 1 TABLET BY MOUTH EVERY DAY       Hypertension Medications Protocol Failed - 2/17/2025  2:44 PM        Failed - Last BP reading less than 140/90     BP Readings from Last 1 Encounters:   12/20/24 154/82               Passed - CMP or BMP in past 12 months        Passed - In person appointment or virtual visit in the past 12 mos or appointment in next 3 mos     Recent Outpatient Visits              1 month ago [facility]ential hypertension with [facility] 140[facility]    4 months ago [facility] PSA    [facility]    Office Visit    8 months ago Essential hypertension with goal blood pressure [facility] 140/90    [facility]    10 months [facility]    11 months ago [facility] physical [facility]          Future Appointments         Provider Department Appt Notes    In 1 month [facility]    In 1 month [facility] 6 month                    Passed - EGFRCR or GFRAA > 50     GFR Evaluation  EGFRCR: 60 , resulted on 3/15/2024          Passed - Medication is active on med list               Future Appointments         Provider Department Appt Notes    In 1 month  Petey Oquendo MD [facility] 3M FU    In 1 month Brian Dee MD [facility] 6 month          Recent Outpatient Visits              1 month ago Essential hypertension with goal blood pressure less than 140/90    [facility] Petey Oquendo MD    Office Visit    4 months ago [facility]    Office Visit    8 months ago Essential hypertension with goal blood pressure less than 140/90    [facility] Petey Oquendo MD    Office Visit    10 months ago [facility]    Office Visit    11 months ago Routine physical examination    [facility] Petey Oquendo MD    Office Visit

## 2025-02-18 RX ORDER — AMLODIPINE BESYLATE 10 MG/1
10 TABLET ORAL DAILY
Qty: 90 TABLET | Refills: 3 | Status: SHIPPED | OUTPATIENT
Start: 2025-02-18

## 2025-03-10 ENCOUNTER — PATIENT MESSAGE (OUTPATIENT)
Dept: INTERNAL MEDICINE CLINIC | Facility: CLINIC | Age: 65
End: 2025-03-10

## 2025-03-10 DIAGNOSIS — Z00.00 ANNUAL PHYSICAL EXAM: Primary | ICD-10-CM

## 2025-03-10 DIAGNOSIS — Z12.5 ENCOUNTER FOR SCREENING PROSTATE SPECIFIC ANTIGEN (PSA) MEASUREMENT: ICD-10-CM

## 2025-03-18 ENCOUNTER — LAB ENCOUNTER (OUTPATIENT)
Dept: LAB | Facility: HOSPITAL | Age: 65
End: 2025-03-18
Attending: NURSE PRACTITIONER
Payer: MEDICARE

## 2025-03-18 DIAGNOSIS — Z00.00 ANNUAL PHYSICAL EXAM: ICD-10-CM

## 2025-03-18 DIAGNOSIS — Z12.5 ENCOUNTER FOR SCREENING PROSTATE SPECIFIC ANTIGEN (PSA) MEASUREMENT: ICD-10-CM

## 2025-03-18 LAB
ALBUMIN SERPL-MCNC: 4.1 G/DL (ref 3.2–4.8)
ALBUMIN/GLOB SERPL: 1.7 {RATIO} (ref 1–2)
ALP LIVER SERPL-CCNC: 90 U/L
ALT SERPL-CCNC: 23 U/L
ANION GAP SERPL CALC-SCNC: 8 MMOL/L (ref 0–18)
AST SERPL-CCNC: 27 U/L (ref ?–34)
BASOPHILS # BLD AUTO: 0.02 X10(3) UL (ref 0–0.2)
BASOPHILS NFR BLD AUTO: 0.3 %
BILIRUB SERPL-MCNC: 0.9 MG/DL (ref 0.2–1.1)
BUN BLD-MCNC: 9 MG/DL (ref 9–23)
BUN/CREAT SERPL: 6.6 (ref 10–20)
CALCIUM BLD-MCNC: 9.2 MG/DL (ref 8.7–10.4)
CHLORIDE SERPL-SCNC: 108 MMOL/L (ref 98–112)
CHOLEST SERPL-MCNC: 146 MG/DL (ref ?–200)
CO2 SERPL-SCNC: 24 MMOL/L (ref 21–32)
COMPLEXED PSA SERPL-MCNC: 1.37 NG/ML (ref ?–4)
CREAT BLD-MCNC: 1.36 MG/DL
DEPRECATED RDW RBC AUTO: 42.5 FL (ref 35.1–46.3)
EGFRCR SERPLBLD CKD-EPI 2021: 58 ML/MIN/1.73M2 (ref 60–?)
EOSINOPHIL # BLD AUTO: 0.1 X10(3) UL (ref 0–0.7)
EOSINOPHIL NFR BLD AUTO: 1.7 %
ERYTHROCYTE [DISTWIDTH] IN BLOOD BY AUTOMATED COUNT: 12.8 % (ref 11–15)
FASTING PATIENT LIPID ANSWER: YES
FASTING STATUS PATIENT QL REPORTED: YES
GLOBULIN PLAS-MCNC: 2.4 G/DL (ref 2–3.5)
GLUCOSE BLD-MCNC: 105 MG/DL (ref 70–99)
HCT VFR BLD AUTO: 41.2 %
HDLC SERPL-MCNC: 69 MG/DL (ref 40–59)
HGB BLD-MCNC: 14.5 G/DL
IMM GRANULOCYTES # BLD AUTO: 0.01 X10(3) UL (ref 0–1)
IMM GRANULOCYTES NFR BLD: 0.2 %
LDLC SERPL CALC-MCNC: 62 MG/DL (ref ?–100)
LYMPHOCYTES # BLD AUTO: 1.63 X10(3) UL (ref 1–4)
LYMPHOCYTES NFR BLD AUTO: 27.6 %
MCH RBC QN AUTO: 31.8 PG (ref 26–34)
MCHC RBC AUTO-ENTMCNC: 35.2 G/DL (ref 31–37)
MCV RBC AUTO: 90.4 FL
MONOCYTES # BLD AUTO: 0.95 X10(3) UL (ref 0.1–1)
MONOCYTES NFR BLD AUTO: 16.1 %
NEUTROPHILS # BLD AUTO: 3.2 X10 (3) UL (ref 1.5–7.7)
NEUTROPHILS # BLD AUTO: 3.2 X10(3) UL (ref 1.5–7.7)
NEUTROPHILS NFR BLD AUTO: 54.1 %
NONHDLC SERPL-MCNC: 77 MG/DL (ref ?–130)
OSMOLALITY SERPL CALC.SUM OF ELEC: 289 MOSM/KG (ref 275–295)
PLATELET # BLD AUTO: 301 10(3)UL (ref 150–450)
POTASSIUM SERPL-SCNC: 4 MMOL/L (ref 3.5–5.1)
PROT SERPL-MCNC: 6.5 G/DL (ref 5.7–8.2)
RBC # BLD AUTO: 4.56 X10(6)UL
SODIUM SERPL-SCNC: 140 MMOL/L (ref 136–145)
T3FREE SERPL-MCNC: 3.36 PG/ML (ref 2.4–4.2)
T4 FREE SERPL-MCNC: 1.4 NG/DL (ref 0.8–1.7)
TRIGL SERPL-MCNC: 77 MG/DL (ref 30–149)
TSI SER-ACNC: 0.4 UIU/ML (ref 0.55–4.78)
VLDLC SERPL CALC-MCNC: 11 MG/DL (ref 0–30)
WBC # BLD AUTO: 5.9 X10(3) UL (ref 4–11)

## 2025-03-18 PROCEDURE — 80061 LIPID PANEL: CPT

## 2025-03-18 PROCEDURE — 84443 ASSAY THYROID STIM HORMONE: CPT

## 2025-03-18 PROCEDURE — 85025 COMPLETE CBC W/AUTO DIFF WBC: CPT

## 2025-03-18 PROCEDURE — 84439 ASSAY OF FREE THYROXINE: CPT

## 2025-03-18 PROCEDURE — 36415 COLL VENOUS BLD VENIPUNCTURE: CPT

## 2025-03-18 PROCEDURE — 80053 COMPREHEN METABOLIC PANEL: CPT

## 2025-03-18 PROCEDURE — 84481 FREE ASSAY (FT-3): CPT

## 2025-03-21 ENCOUNTER — OFFICE VISIT (OUTPATIENT)
Dept: INTERNAL MEDICINE CLINIC | Facility: CLINIC | Age: 65
End: 2025-03-21
Payer: MEDICARE

## 2025-03-21 VITALS
HEART RATE: 85 BPM | RESPIRATION RATE: 16 BRPM | TEMPERATURE: 98 F | WEIGHT: 190 LBS | HEIGHT: 69 IN | DIASTOLIC BLOOD PRESSURE: 68 MMHG | BODY MASS INDEX: 28.14 KG/M2 | SYSTOLIC BLOOD PRESSURE: 136 MMHG | OXYGEN SATURATION: 96 %

## 2025-03-21 DIAGNOSIS — J30.9 ALLERGIC RHINITIS, UNSPECIFIED SEASONALITY, UNSPECIFIED TRIGGER: ICD-10-CM

## 2025-03-21 DIAGNOSIS — M10.9 GOUT, UNSPECIFIED CAUSE, UNSPECIFIED CHRONICITY, UNSPECIFIED SITE: ICD-10-CM

## 2025-03-21 DIAGNOSIS — I10 ESSENTIAL HYPERTENSION WITH GOAL BLOOD PRESSURE LESS THAN 140/90: Primary | ICD-10-CM

## 2025-03-21 DIAGNOSIS — Z13.6 SCREENING, HEART DISEASE, ISCHEMIC: ICD-10-CM

## 2025-03-21 DIAGNOSIS — N40.0 BENIGN PROSTATIC HYPERPLASIA, UNSPECIFIED WHETHER LOWER URINARY TRACT SYMPTOMS PRESENT: ICD-10-CM

## 2025-03-21 PROCEDURE — G2211 COMPLEX E/M VISIT ADD ON: HCPCS | Performed by: INTERNAL MEDICINE

## 2025-03-21 PROCEDURE — 99214 OFFICE O/P EST MOD 30 MIN: CPT | Performed by: INTERNAL MEDICINE

## 2025-03-21 NOTE — PROGRESS NOTES
HPI:    Patient ID: Hima Reyez is a 65 year old male.    HPI    Patient returns to the office today to discuss chronic medical issues as listed on the active problem list below.  Patient last seen in the office by me on December 20 of last year.  During the last visit, the following changes were made: Increase in terazosin from 5 up to 10 mg a day for blood pressure control.  Since the last visit, the patient has seen the following doctors: none    Full blood work was done recently.  Everything was essentially normal.  TSH was suppressed but normal free T3 and free T4.  Everything else looked reasonable.    Today, the patient offers the following complaints: Having some allergy issues. Had a fall. Injured his hand. Patient does check blood pressure at home. It has been running 121-162/under 90.  No side effects with the meds. The urine flow is better. No recent gout episodes.   Patient describes diet as fairly good. Some increase in sugar intake recently.   For exercise, the patient walks and bikes.   Tobacco and alcohol use reviewed.   Current medications reviewed.   Health maintenance issues reviewed.    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    BPH (benign prostatic hyperplasia)    Nocturia        HISTORY:  Past Medical History:    Back problem    lumbar microdiscectomy    BPH (benign prostatic hyperplasia)    Cardiovascular disorder    Cardiac calcium scan- normal    Celiac disease (HCC)    Disc displacement    Microdiscectomy L4-5; good outcome, Completed at [facility]    High blood pressure    High cholesterol    Hypertension    Knee injuries    bilateral, arthroscopy, detached patella Rt /Lt Football injury    Leg cramps     [FreeTextEntry1] : 48 y/o M, history of prior lumbar surgery L5-S1 fusion, back pain radiating to RLE associated with numbness and tingling of the right leg since 05/2024. MRI L4-5 increasing large disc herniation encroaching on the right neural foramina deforming the thecal sac and impinging on the lateral recess and L5 nerve root. Patient is s/p L4-5 lumbar microdiscectomy on 8/21/24. His leg pain is much better. He still has to finish PT.       Severe leg cramps with lisinopril-HCTZ    Osteoarthritis    Renal cell carcinoma (HCC)    Right kidney removed    Tonsillitis    tonsillectomy    Visual impairment    reading glasses      Past Surgical History:   Procedure Laterality Date    Colonoscopy      Colonoscopy N/A 5/23/2024    Procedure: COLONOSCOPY;  Surgeon: Colton Bailey MD;  Location: [facility] MAIN OR    Knee arthroscopy Bilateral     knee injuries    Laparoscopy, surgical; nephrectomy Right April 2015    Robotic surgery at [facility]    Other surgical history      Microdiscectomy L4-5; good outcome, Completed at [facility] (Disc displacement)    Rectum surgery procedure unlisted  1994    repair of rectal tear    Spine surgery procedure unlisted      lumbar microdiscectomy    Tonsillectomy      tonstillitis 1993    Upper gi endoscopy,exam        Family History   Problem Relation Age of Onset    Cancer Father         salivary and kidney    Hypertension Mother     Arthritis Mother         gout      Social History     Socioeconomic History    Marital status:     Number of children: 2   Occupational History    Occupation:  /      Employer: [employer]   Tobacco Use    Smoking status: Former     Types: Cigars    Smokeless tobacco: Never    Tobacco comments:     tobacco on and off for 20 years   Vaping Use    Vaping status: Never Used   Substance and Sexual Activity    Alcohol use: Yes     Comment: 4 times a week    Drug use: Yes     Types: Cannabis     Comment: daily    Sexual activity: Not Currently     Partners: Female   Other Topics Concern    Caffeine Concern Yes     Comment: coffee, chocolate, 1 cup daily     Social Drivers of Health     Food Insecurity: No Food Insecurity (3/21/2025)    NCSS - Food Insecurity     Worried About Running Out of Food in the Last Year: No     Ran Out of Food in the Last Year: No   Transportation Needs: No Transportation Needs (3/21/2025)    NCSS - Transportation      Lack of Transportation: No   Housing Stability: Not At Risk (3/21/2025)    NCSS - Housing/Utilities     Has Housing: Yes     Worried About Losing Housing: No     Unable to Get Utilities: No          Review of Systems          Current Outpatient Medications   Medication Sig Dispense Refill    amLODIPine 10 MG Oral Tab Take 1 tablet (10 mg total) by mouth daily. 90 tablet 3    terazosin 10 MG Oral Cap Take 1 capsule (10 mg total) by mouth nightly. 90 capsule 1    Olmesartan Medoxomil 40 MG Oral Tab Take 1 tablet (40 mg total) by mouth daily. 90 tablet 3    metoprolol succinate  MG Oral Tablet 24 Hr Take 1 tablet (100 mg total) by mouth daily. Along with 1 tablet (50mg) to equal 150mg daily dose 90 tablet 3    metoprolol succinate ER 50 MG Oral Tablet 24 Hr Take 1 tablet (50 mg total) by mouth daily. Along with 1 tablet (100mg) to equal 150mg daily dose. 90 tablet 3    cholecalciferol 125 MCG (5000 UT) Oral Tab Take 1 tablet (5,000 Units total) by mouth daily.      omega-3 fatty acids 1000 MG Oral Cap Take 1,000 mg by mouth daily.      Vitamin E 180 MG (400 UNIT) Oral Cap Take by mouth.      psyllium (METAMUCIL) 28 % Oral Powd Pack Take 1 packet by mouth 2 (two) times daily.      Digital Therapy (HELLO HEART BLOOD PRESSURE) Does not apply Kit daily.      Multiple Vitamins-Minerals (MULTIVITAMIN ADULT OR) Take by mouth.       Allergies:Allergies[1]     PHYSICAL EXAM:   /74 (BP Location: Right arm, Patient Position: Sitting, Cuff Size: large)   Pulse 85   Temp 98 °F (36.7 °C) (Temporal)   Resp 16   Ht 5' 9\" (1.753 m)   Wt 190 lb (86.2 kg)   SpO2 96%   BMI 28.06 kg/m²      Physical Exam  Constitutional:       Appearance: Normal appearance. He is well-developed.   Eyes:      Conjunctiva/sclera: Conjunctivae normal.   Neck:      Vascular: No carotid bruit.   Cardiovascular:      Rate and Rhythm: Normal rate and regular rhythm.      Pulses: Normal pulses.      Heart sounds: Normal heart sounds. No murmur  heard.  Pulmonary:      Effort: Pulmonary effort is normal.      Breath sounds: Normal breath sounds. No wheezing or rales.   Musculoskeletal:      Right lower leg: No edema.      Left lower leg: No edema.   Lymphadenopathy:      Cervical: No cervical adenopathy.   Skin:     General: Skin is warm and dry.      Findings: No rash.   Neurological:      General: No focal deficit present.      Mental Status: He is alert.   Psychiatric:         Mood and Affect: Mood normal.         Behavior: Behavior normal.         Thought Content: Thought content normal.                 ASSESSMENT/PLAN:   1. Essential hypertension with goal blood pressure less than 140/90 blood pressure  Blood pressure was elevated when checked by nurse but better when checked by physician.  Numbers at home are borderline.  He is already on multiple medications.  He has not tolerated diuretics in the past.  We will continue the current dose of the medications.  Recent blood test reviewed in detail.  Everything looked good.  Kidney function is slightly decreased but stable.  Patient is to return in 6 months for Medicare annual wellness visit.  This visit was initially scheduled as a physical but not a Medicare physical so we converted it to a regular follow-up.    2. Benign prostatic hyperplasia, unspecified whether lower urinary tract symptoms present  Better on the higher dose of terazosin.  Continue current medications.    3. Gout, unspecified cause, unspecified chronicity, unspecified site  No recent spells.  Monitor.    4. Allergic rhinitis, unspecified seasonality, unspecified trigger  Been flaring up recently.  Continue with Xyzal and Nasacort.    5. Screening, heart disease, ischemic  Patient with multiple risk factors.  He had a negative stress test several years ago.  We will do a cardiac calcium scan now.  He is asymptomatic.  - CT CALCIUM SCORING; Future        Meds This Visit:  Requested Prescriptions      No prescriptions requested or  ordered in this encounter       Imaging & Referrals:  None         Petey Oquendo MD        [1]   Allergies  Allergen Reactions    Lisinopril-Hydrochlorothiazide      Other reaction(s): Other  Severe leg cramps and excessive urination   Severe leg cramps    Wheat Gluten DIARRHEA and NAUSEA AND VOMITING    Wheat Germ OTHER (SEE COMMENTS)

## 2025-04-04 ENCOUNTER — OFFICE VISIT (OUTPATIENT)
Dept: SURGERY | Facility: CLINIC | Age: 65
End: 2025-04-04
Payer: MEDICARE

## 2025-04-04 DIAGNOSIS — N13.8 BPH WITH OBSTRUCTION/LOWER URINARY TRACT SYMPTOMS: ICD-10-CM

## 2025-04-04 DIAGNOSIS — N40.1 BPH WITH OBSTRUCTION/LOWER URINARY TRACT SYMPTOMS: ICD-10-CM

## 2025-04-04 DIAGNOSIS — C64.9 MALIGNANT NEOPLASM OF KIDNEY, UNSPECIFIED LATERALITY (HCC): Primary | ICD-10-CM

## 2025-04-04 DIAGNOSIS — R97.20 ELEVATED PSA: ICD-10-CM

## 2025-04-04 PROCEDURE — 99213 OFFICE O/P EST LOW 20 MIN: CPT | Performed by: UROLOGY

## 2025-04-04 NOTE — PROGRESS NOTES
Hima Reyez is a 65 year old male.    HPI:     Chief Complaint   Patient presents with    elevated psa     PSA done 3/18/25 pt has no complaints     Kidney Problem     robotic assisted radical nephrectomy at the [facility] in 2015        65-year-old male in follow-up to a visit October 4, 2024.  Has a history of elevated but now normalized PSA status post MRI ultrasound fusion prostate biopsy September 2023 that demonstrated ASAP.  Biopsy done for a PSA 5.62 April 2023.  His PSA subsequently has normalized most recently October 4, 2024 at 1.93.  Digital prostate exam performed at last visit unremarkable.  Previous prostate MRI demonstrated PI-RADS 3 lesion.  He feels well.  No problems or concerns.  No history of prostate cancer in the family.    Has a history of renal cell carcinoma, pT1a papillary status post robotic assisted radical nephrectomy at [facility] in 2015 without evidence of disease recurrence since then.  CT abdomen and pelvis at the [facility] November 2016 no evidence of disease.  Chest x-ray performed at last visit unremarkable.  His labs have been normal.  Denies any gross hematuria or dysuria.  Remains on terazosin.  Dose increased from 5 to 10 mg by his primary care physician mostly to help with further control of his hypertension.  IPSS score 10 quality-of-life index score is 3  HISTORY:  Past Medical History:    Back problem    lumbar microdiscectomy    BPH (benign prostatic hyperplasia)    Cardiovascular disorder    Cardiac calcium scan- normal    Celiac disease (HCC)    Disc displacement    Microdiscectomy L4-5; good outcome, Completed at [facility]    High blood pressure    High cholesterol    Hypertension    Knee injuries    bilateral, arthroscopy, detached patella Rt /Lt Football injury    Leg cramps    Severe leg cramps with lisinopril-HCTZ    Osteoarthritis    Renal cell carcinoma (HCC)    Right kidney removed    Tonsillitis    tonsillectomy    Visual  impairment    reading glasses      Past Surgical History:   Procedure Laterality Date    Colonoscopy      Colonoscopy N/A 5/23/2024    Procedure: COLONOSCOPY;  Surgeon: Colton Bailey MD;  Location: [facility] MAIN OR    Knee arthroscopy Bilateral     knee injuries    Laparoscopy, surgical; nephrectomy Right April 2015    Robotic surgery at [facility]    Other surgical history      Microdiscectomy L4-5; good outcome, Completed at [facility] (Disc displacement)    Rectum surgery procedure unlisted  1994    repair of rectal tear    Spine surgery procedure unlisted      lumbar microdiscectomy    Tonsillectomy      tonstillitis 1993    Upper gi endoscopy,exam        Family History   Problem Relation Age of Onset    Cancer Father         salivary and kidney    Hypertension Mother     Arthritis Mother         gout      Social History:   Social History     Socioeconomic History    Marital status:     Number of children: 2   Occupational History    Occupation:  /      Employer: [employer]   Tobacco Use    Smoking status: Former     Types: Cigars    Smokeless tobacco: Never    Tobacco comments:     tobacco on and off for 20 years   Vaping Use    Vaping status: Never Used   Substance and Sexual Activity    Alcohol use: Yes     Comment: 4 times a week    Drug use: Yes     Types: Cannabis     Comment: daily    Sexual activity: Not Currently     Partners: Female   Other Topics Concern    Caffeine Concern Yes     Comment: coffee, chocolate, 1 cup daily     Social Drivers of Health     Food Insecurity: No Food Insecurity (3/21/2025)    NCSS - Food Insecurity     Worried About Running Out of Food in the Last Year: No     Ran Out of Food in the Last Year: No   Transportation Needs: No Transportation Needs (3/21/2025)    NCSS - Transportation     Lack of Transportation: No   Housing Stability: Not At Risk (3/21/2025)    NCSS - Housing/Utilities     Has Housing: Yes     Worried  About Losing Housing: No     Unable to Get Utilities: No        Medications (Active prior to today's visit):  Current Outpatient Medications   Medication Sig Dispense Refill    amLODIPine 10 MG Oral Tab Take 1 tablet (10 mg total) by mouth daily. 90 tablet 3    terazosin 10 MG Oral Cap Take 1 capsule (10 mg total) by mouth nightly. 90 capsule 1    Olmesartan Medoxomil 40 MG Oral Tab Take 1 tablet (40 mg total) by mouth daily. 90 tablet 3    metoprolol succinate  MG Oral Tablet 24 Hr Take 1 tablet (100 mg total) by mouth daily. Along with 1 tablet (50mg) to equal 150mg daily dose 90 tablet 3    metoprolol succinate ER 50 MG Oral Tablet 24 Hr Take 1 tablet (50 mg total) by mouth daily. Along with 1 tablet (100mg) to equal 150mg daily dose. 90 tablet 3    cholecalciferol 125 MCG (5000 UT) Oral Tab Take 1 tablet (5,000 Units total) by mouth daily.      omega-3 fatty acids 1000 MG Oral Cap Take 1,000 mg by mouth daily.      Vitamin E 180 MG (400 UNIT) Oral Cap Take by mouth.      psyllium (METAMUCIL) 28 % Oral Powd Pack Take 1 packet by mouth 2 (two) times daily.      Digital Therapy (HELLO HEART BLOOD PRESSURE) Does not apply Kit daily.      Multiple Vitamins-Minerals (MULTIVITAMIN ADULT OR) Take by mouth.         Allergies:  Allergies[1]      ROS:       PHYSICAL EXAM:        ASSESSMENT/PLAN:   Assessment   Encounter Diagnoses   Name Primary?    Malignant neoplasm of kidney, unspecified laterality (HCC) Yes    Elevated PSA     BPH with obstruction/lower urinary tract symptoms        Recommend:  - Repeat PSA October 2025.  Order entered.  - Follow-up otherwise in 1 year.         Orders This Visit:  Orders Placed This Encounter   Procedures    PSA Diagnostic [E]       Meds This Visit:  Requested Prescriptions      No prescriptions requested or ordered in this encounter       Imaging & Referrals:  None     4/4/2025  Brian Dee MD               [1]   Allergies  Allergen Reactions    Lisinopril-Hydrochlorothiazide       Other reaction(s): Other  Severe leg cramps and excessive urination   Severe leg cramps    Wheat Gluten DIARRHEA and NAUSEA AND VOMITING    Wheat Germ OTHER (SEE COMMENTS)

## 2025-04-14 RX ORDER — METOPROLOL SUCCINATE 50 MG/1
50 TABLET, EXTENDED RELEASE ORAL DAILY
Qty: 90 TABLET | Refills: 3 | Status: SHIPPED | OUTPATIENT
Start: 2025-04-14

## 2025-04-14 RX ORDER — METOPROLOL SUCCINATE 100 MG/1
100 TABLET, EXTENDED RELEASE ORAL DAILY
Qty: 90 TABLET | Refills: 3 | Status: SHIPPED | OUTPATIENT
Start: 2025-04-14

## 2025-04-14 NOTE — TELEPHONE ENCOUNTER
Refill passed per [facility] protocols.    Requested Prescriptions   Pending Prescriptions Disp Refills    METOPROLOL SUCCINATE ER 50 MG Oral Tablet 24 Hr [Pharmacy Med Name: METOPROLOL SUCC ER 50 MG TAB] 90 tablet 3     Sig: Take 1 tablet (50 mg total) by mouth daily. Take with 100mg tablet for total 150mg daily dose.       Hypertension Medications Protocol Passed - 4/14/2025  6:03 PM       METOPROLOL SUCCINATE  MG Oral Tablet 24 Hr [Pharmacy Med Name: METOPROLOL SUCC  MG TAB] 90 tablet 3     Sig: Take 1 tablet (100 mg total) by mouth daily. Take with 50mg tablet for total 150mg daily dose       Hypertension Medications Protocol Passed - 4/14/2025  6:03 PM

## 2025-06-19 RX ORDER — OLMESARTAN MEDOXOMIL 40 MG/1
40 TABLET ORAL DAILY
Qty: 90 TABLET | Refills: 3 | Status: SHIPPED | OUTPATIENT
Start: 2025-06-19

## 2025-06-23 RX ORDER — TERAZOSIN 10 MG/1
10 CAPSULE ORAL NIGHTLY
Qty: 90 CAPSULE | Refills: 3 | Status: SHIPPED | OUTPATIENT
Start: 2025-06-23

## (undated) DEVICE — BLADE SHVR COOLCUT 13CM 4MM

## (undated) DEVICE — ARTHROSCOPY: Brand: MEDLINE INDUSTRIES, INC.

## (undated) DEVICE — GOWN SURG AERO BLUE PERF XLG

## (undated) DEVICE — 3M™ STERI-STRIP™ REINFORCED ADHESIVE SKIN CLOSURES, R1547, 1/2 IN X 4 IN (12 MM X 100 MM), 6 STRIPS/ENVELOPE: Brand: 3M™ STERI-STRIP™

## (undated) DEVICE — AMBIENT SUPER TURBOVAC 90 IFS: Brand: COBLATION

## (undated) DEVICE — STERILE TETRA-FLEX CF, ELASTIC BANDAGE, 4" X 5.5YD: Brand: TETRA-FLEX™CF

## (undated) DEVICE — ENCORE® LATEX MICRO SIZE 8.5, STERILE LATEX POWDER-FREE SURGICAL GLOVE: Brand: ENCORE

## (undated) DEVICE — TUBING IRR 16FT CNT WV 3 ASCP

## (undated) DEVICE — 3 ML SYRINGE LUER-LOCK TIP: Brand: MONOJECT

## (undated) DEVICE — SUCTION CANISTER, 3000CC,SAFELINER: Brand: DEROYAL

## (undated) DEVICE — SUTURE MONOCRYL 4-0 Y845G

## (undated) DEVICE — ENCORE® LATEX ACCLAIM SIZE 8.5, STERILE LATEX POWDER-FREE SURGICAL GLOVE: Brand: ENCORE

## (undated) DEVICE — ZIMMER® STERILE DISPOSABLE TOURNIQUET CUFF WITH PLC, DUAL PORT, SINGLE BLADDER, 30 IN. (76 CM)

## (undated) DEVICE — SOL  .9 3000ML

## (undated) DEVICE — DRAPE SRG 70X60IN SPLT U IMPRV

## (undated) NOTE — LETTER
7/22/2020              Salkarthik Delgado [facility] [phone]         To Whom It May Concern,      Nola Betancourt is under my medical care. He has several medical issues that put him at high risk for COVID 19 infection.   It is

## (undated) NOTE — LETTER
[facility] [address] [address]  [phone]  Authorization for Imaging Procedure    I hereby authorize Dr. Benjamin Delong, my physician and his/her assistants (if applicable), which may include medical students, residents, and/or fellows, to perform the following procedure and administer such anesthesia as may be determined necessary by my physician: [address]  on [facility]. 2.  I recognize that during the procedure, unforeseen conditions may necessitate additional or different procedures than those listed above. I, therefore, further authorize and request that the above-named physician, assistants, or designees perform such procedures as are, in their judgment, necessary and desirable. 3.  My physician has discussed prior to my procedure the potential benefits, risks and side effects of this procedure; the likelihood of achieving goals; and potential problems that might occur during recuperation. They also discussed reasonable alternatives to the procedure, including risks, benefits, and side effects related to the alternatives and risks related to not receiving this procedure. I have had all my questions answered and I acknowledge that no guarantee has been made as to the result that may be obtained. 4.  Should the need arise during my procedure, which includes change of level of care prior to discharge, I also consent to the administration of blood and/or blood products. Further, I understand that despite careful testing and screening of blood or blood products by collecting agencies, I may still be subject to ill effects as a result of receiving a blood transfusion and/or blood products. The following are some, but not all, of the potential risks that can occur: fever and allergic reactions, hemolytic reactions, transmission of diseases such as Hepatitis, AIDS and Cytomegalovirus (CMV) and fluid overload.  In the event that I wish to have an autologous transfusion of my own blood, or a directed donor transfusion, I will discuss this with my physician. Check only if Refusing Blood or Blood Products  I understand refusal of blood or blood products as deemed necessary by my physician may have serious consequences to my condition to include possible death. I hereby assume responsibility for my refusal and release the hospital, its personnel, and my physicians from any responsibility for the consequences of my refusal.   [  ] Patient Refuses Blood      5. I authorize the use of any specimen, organs, tissues, body parts or foreign objects that may be removed from my body during the procedure for diagnosis, research or teaching purposes and their subsequent disposal by hospital authorities. I also authorize the release of specimen test results and/or written reports to my treating physician on the hospital medical staff or other referring or consulting physicians involved in my care, at the discretion of the Pathologist or my treating physician. 6.  I consent to the photographing or videotaping of the procedures to be performed, including appropriate portions of my body for medical, scientific, or educational purposes, provided my identity is not revealed by the pictures or by descriptive texts accompanying them. If the procedure has been photographed/videotaped, the physician will obtain the original picture, image, videotape or CD. The hospital will not be responsible for storage, release or maintenance of the picture, image, tape or CD.   7.  I consent to the presence of a  or observers in the operating room as deemed necessary by my physician or their designees. 8.  I recognize that in the event my procedure results in extended X-Ray/fluoroscopy time, I may develop a skin reaction. 9.   If I have a Do Not Attempt Resuscitation (DNAR) order in place, that status will be suspended while in the operating room, procedural suite, and during the recovery period unless otherwise explicitly stated by me (or a person authorized to consent on my behalf). The performing physician or my attending physician will determine when the applicable recovery period ends for purposes of reinstating the DNAR order. 10.  I acknowledge that my physician has explained sedation/analgesia administration to me including the risk and benefits I consent to the administration of sedation/analgesia as may be necessary or desirable in the judgment of my physician. I CERTIFY THAT I HAVE READ AND FULLY UNDERSTAND THE ABOVE CONSENT FOR THE PROCEDURE. Signature of Patient: _____________________________________________________________  Responsible person in case of minor, unconscious: ____________________________________  Relationship to patient:  __________________________________________________________  Signature of Witness: _______________________________Date: _________Time: __________    Statement of Physician: My signature below affirms that prior to the time of the procedure, I have explained to the patient and/or his guardian, the risks and benefits involved in the proposed treatment and any reasonable alternative to the proposed treatment. I have also explained the risks and benefits involved in the refusal of the proposed treatment and have answered the patient's questions. If I have a significant financial interest in a co-management agreement or a significant financial interest in any product or implant, or other significant relationship used in the procedure/surgery, I have disclosed this and had a discussion with my patient.   Signature of Physician:   _________________________________Date:_____________Time:________    Patient Name: Rachel Bell : 3/5/1960  Printed: 2023   Medical Record #: [id number]

## (undated) NOTE — MR AVS SNAPSHOT
[address] [address]  [phone]               Thank you for choosing us for your health care visit with Kacey Ayala MD.  We are glad to serve you and happy to provide you with this summar authorization, such as [place], please feel free to schedule your appointment immediately. However, if you are unsure about the requirements for authorization, please wait 5-7 days and then contact your physician's   office.  At that time, you lupe Commonly known as:  FLOMAX                Where to Get Your Medications      These medications were sent to [facility]/PHARMACY #[id number]- [address] [facility], [address]. [phone], [address] [facility] [address] [place] [address]     Phone:  [phone] Start activities slowly and build up over time Do what you like   Get your heart pumping – brisk walking, biking, swimming Even 10 minute increments are effective and add up over the week   2 ½ hours per week – spread out over time Use a lu to keep you

## (undated) NOTE — MR AVS SNAPSHOT
[place]  [address]  [phone]               Thank you for choosing us for your health care visit with Poppy Ayers MD.  We are glad to serve you and happy to provide you with this summary If you've recently had a stay at the Hospital you can access your discharge instructions in [facility] by going to Visits < Admission Summaries.  If you've been to the Emergency Department or your doctor's office, you can view your past visit information in My

## (undated) NOTE — MR AVS SNAPSHOT
[place]  [address]  [phone]               Thank you for choosing us for your health care visit with Valencia Vaughn MD.  We are glad to serve you and happy to provide you with this summary [place], [address], [address] [facility]  [address] [place] [address]    [address]  [address] [facility] NELSY [place], [place]    It is the patient's responsibility to check KETONES (URINE DIPSTICK) neg Negative mg/dL    SPECIFIC GRAVITY 1.010 1.005 - 1.030    OCCULT BLOOD trace Negative    PH, URINE 7.0 4.5 - 8.0    PROTEIN (URINE DIPSTICK) neg Negative/Trace mg/dL    UROBILINOGEN,SEMI-QN 0.2 0.0 - 1.9 mg/dL    NITRITE, URIN

## (undated) NOTE — LETTER
2/1/2019          To Whom It May Concern:    Cat Eldridge is currently under my medical care. Please excuse Migelbrianna Elizabeth from work for the next 4 weeks. If you require additional information please contact our office.         Sincerely,    Robert Munroe

## (undated) NOTE — LETTER
3/1/2019          To Whom It May Concern:    Anh Fuller is currently under my medical care. Please excuse him from work until surgery. If you require additional information please contact our office.         Sincerely,    Marcio Araujo MD

## (undated) NOTE — LETTER
3/22/2019          To Whom It May Concern:    Juan Manueln Lennox is currently under my medical care. Please excuse Verito Cintron from work for the next 4 weeks. Verito Cintron will be re evaluated at this time.     If you require additional information please contact our

## (undated) NOTE — LETTER
4/19/2019          To Whom It May Concern:    Rajendra Laguna is currently under my medical care. He may return to work on 4/30/2019 with no restrictions. If you require additional information please contact our office.         Sincerely,    Ayesha Back